# Patient Record
Sex: MALE | Race: WHITE | NOT HISPANIC OR LATINO | Employment: FULL TIME | URBAN - METROPOLITAN AREA
[De-identification: names, ages, dates, MRNs, and addresses within clinical notes are randomized per-mention and may not be internally consistent; named-entity substitution may affect disease eponyms.]

---

## 2017-06-27 ENCOUNTER — TREATMENT (OUTPATIENT)
Dept: PHYSICAL THERAPY | Facility: CLINIC | Age: 57
End: 2017-06-27

## 2017-06-27 DIAGNOSIS — Z02.1 DRUG SCREENING, PRE-EMPLOYMENT: Primary | ICD-10-CM

## 2017-06-27 PROCEDURE — PDS: Performed by: PHYSICAL THERAPIST

## 2020-07-14 ENCOUNTER — CONVERSION ENCOUNTER (OUTPATIENT)
Dept: OTHER | Facility: HOSPITAL | Age: 60
End: 2020-07-14

## 2020-07-14 ENCOUNTER — OFFICE VISIT CONVERTED (OUTPATIENT)
Dept: NEUROSURGERY | Facility: CLINIC | Age: 60
End: 2020-07-14
Attending: NEUROLOGICAL SURGERY

## 2020-08-06 ENCOUNTER — HOSPITAL ENCOUNTER (OUTPATIENT)
Dept: PHYSICAL THERAPY | Facility: CLINIC | Age: 60
Setting detail: RECURRING SERIES
Discharge: HOME | End: 2020-10-22
Attending: NEUROLOGICAL SURGERY

## 2020-08-19 ENCOUNTER — CONVERSION ENCOUNTER (OUTPATIENT)
Dept: FAMILY MEDICINE CLINIC | Age: 60
End: 2020-08-19

## 2020-08-19 ENCOUNTER — HOSPITAL ENCOUNTER (OUTPATIENT)
Dept: OTHER | Facility: HOSPITAL | Age: 60
Discharge: HOME | End: 2020-08-19
Attending: FAMILY MEDICINE

## 2020-08-22 LAB — SARS-COV-2 RNA SPEC QL NAA+PROBE: DETECTED

## 2020-09-25 ENCOUNTER — HOSPITAL ENCOUNTER (OUTPATIENT)
Dept: OTHER | Facility: HOSPITAL | Age: 60
Discharge: HOME | End: 2020-09-25
Attending: NEUROLOGICAL SURGERY

## 2020-10-05 ENCOUNTER — TELEPHONE (OUTPATIENT)
Dept: ORTHOPEDIC SURGERY | Facility: CLINIC | Age: 60
End: 2020-10-05

## 2020-10-12 ENCOUNTER — OFFICE VISIT (OUTPATIENT)
Dept: ORTHOPEDIC SURGERY | Facility: CLINIC | Age: 60
End: 2020-10-12

## 2020-10-12 VITALS — BODY MASS INDEX: 29.62 KG/M2 | HEIGHT: 69 IN | TEMPERATURE: 98 F | WEIGHT: 200 LBS

## 2020-10-12 DIAGNOSIS — M75.101 RIGHT ROTATOR CUFF TEAR ARTHROPATHY: Primary | ICD-10-CM

## 2020-10-12 DIAGNOSIS — M12.811 RIGHT ROTATOR CUFF TEAR ARTHROPATHY: Primary | ICD-10-CM

## 2020-10-12 PROCEDURE — 99203 OFFICE O/P NEW LOW 30 MIN: CPT | Performed by: ORTHOPAEDIC SURGERY

## 2020-10-12 RX ORDER — SIMVASTATIN 40 MG
TABLET ORAL
COMMUNITY
Start: 2020-08-25 | End: 2022-12-21

## 2020-10-12 RX ORDER — MONTELUKAST SODIUM 10 MG/1
10 TABLET ORAL DAILY
COMMUNITY
Start: 2020-09-25 | End: 2023-03-20

## 2020-10-12 RX ORDER — TERAZOSIN 5 MG/1
CAPSULE ORAL
COMMUNITY
Start: 2020-09-25 | End: 2022-12-21 | Stop reason: SDUPTHER

## 2020-10-12 RX ORDER — CYCLOBENZAPRINE HCL 10 MG
TABLET ORAL
COMMUNITY
Start: 2020-09-25

## 2020-10-12 RX ORDER — TRAZODONE HYDROCHLORIDE 50 MG/1
TABLET ORAL
COMMUNITY
Start: 2020-09-27 | End: 2022-12-21

## 2020-10-12 RX ORDER — AMLODIPINE BESYLATE 10 MG/1
10 TABLET ORAL DAILY
COMMUNITY
Start: 2020-09-25

## 2020-10-12 RX ORDER — MELOXICAM 7.5 MG/1
TABLET ORAL
COMMUNITY
Start: 2020-07-15 | End: 2022-12-21

## 2020-10-12 NOTE — PROGRESS NOTES
NEW VISIT    Patient: Jim Verma  ?  YOB: 1960    MRN: [id number]  ?  Chief Complaint   Patient presents with   • Right Shoulder - Pain   • Establish Care      ?  HPI: NEW RIGHT SHOULDER  Jim Levine presents to the office with a massive tear of the rotator cuff.  He has very significant pain in his shoulder for the past 1 year.  His symptoms started with an injury and have slowly and progressively gotten worse.  He works as a  locally in a factory.  He states that his pain is worse with abduction.  He has difficulty with reaching into the overhead position.  Countertop level activities do not bother him as much as reaching into the overhead position.  He has a sense of progressive weakness with lifting heavy weights into the overhead position.  He does have some night pain and rest pain as well.  The patient describes his pain as an intermittent stabbing type of pain.  His MRI documents a large rotator cuff tear with significant atrophy of the supraspinatus.  At age 60 the surgical choices are quite limited for this patient and I would like him to see my partner Dr. HERNÁNDEZ for subspecialty care.      Pain Location: RIGHT shoulder  Radiation: none  Quality: aching, sharp  Intensity/Severity: moderate to severe  Duration: 1 years  Onset quality: gradual   Timing: constant  Aggravating Factors: overhead reaching, reaching backward, reaching forward  Alleviating Factors: NSAIDs, rest, ice  Previous Episodes: no  Associated Symptoms: clicking/popping, decreased ROM, decreased strength  ADLs Affected: grooming/hygiene/toileting/personal care, dressing, recreational activities/sports  Previous Treatment: Anti-inflammatory medication.    This patient is a new patient.  This problem is new to this examiner.      Allergies: No Known Allergies    Medications:   Home Medications:  Current Outpatient Medications on File Prior to Visit   Medication Sig   • amLODIPine (NORVASC) 10 MG  "tablet Take 10 mg by mouth Daily.   • cyclobenzaprine (FLEXERIL) 10 MG tablet TK 1 T PO QHS FOR MUSCULAR PAIN   • meloxicam (MOBIC) 7.5 MG tablet TK 1 T PO D WF FOR ARTHRITIS   • montelukast (SINGULAIR) 10 MG tablet Take 10 mg by mouth Daily.   • simvastatin (ZOCOR) 40 MG tablet TK 1 T PO Q NIGHT   • terazosin (HYTRIN) 5 MG capsule TK 1 C PO HS   • traZODone (DESYREL) 50 MG tablet TK 1 TO 2 TS PO HS     No current facility-administered medications on file prior to visit.      Current Medications:  Scheduled Meds:  PRN Meds:.    I have reviewed the patient's medical history in detail and updated the computerized patient record.  Review and summarization of old records include:    History reviewed. No pertinent past medical history.  History reviewed. No pertinent surgical history.  Social History     Occupational History   • Not on file   Tobacco Use   • Smoking status: Never Smoker   • Smokeless tobacco: Never Used   Substance and Sexual Activity   • Alcohol use: Yes   • Drug use: Defer   • Sexual activity: Defer      History reviewed. No pertinent family history.      Review of Systems   Constitutional: Negative.    HENT: Negative.    Eyes: Negative.    Respiratory: Negative.    Cardiovascular: Negative.    Endocrine: Negative.    Musculoskeletal: Positive for arthralgias.   Skin: Negative.    Allergic/Immunologic: Negative.    Neurological: Negative.    Hematological: Negative.    Psychiatric/Behavioral: Negative.           Wt Readings from Last 3 Encounters:   10/12/20 90.7 kg (200 lb)     Ht Readings from Last 3 Encounters:   10/12/20 175.3 cm (69\")     Body mass index is 29.53 kg/m².  Facility age limit for growth percentiles is 20 years.  Vitals:    10/12/20 1537   Temp: 98 °F (36.7 °C)         Physical Exam  Constitutional: Patient is oriented to person, place, and time. Appears well-developed and well-nourished.   HENT:   Head: Normocephalic and atraumatic.   Eyes: Conjunctivae and EOM are normal. Pupils are " equal, round, and reactive to light.   Cardiovascular: Normal rate, regular rhythm, normal heart sounds and intact distal pulses.   Pulmonary/Chest: Effort normal and breath sounds normal.   Musculoskeletal:   See detailed exam below   Neurological: Alert and oriented to person, place, and time. No sensory deficit. Coordination normal.   Skin: Skin is warm and dry. Capillary refill takes less than 2 seconds. No rash noted. No erythema.   Psychiatric: Patient has a normal mood and affect. His behavior is normal. Judgment and thought content normal.   Nursing note and vitals reviewed.      Ortho Exam:   right shoulder (cta). Rotator cuff function is extremely impaired. There is a high riding humeral head with articulation of the humerus to the undersurface of the acromiohumeral articulation. AC joint is exquisitely tender and painful for patient. Axillary nerve function is well preserved. There is significant winging of the scapula with hollowing of the fossae over the proximal and distal aspects of the spine of the scapula. Forward flexion is 0-90 degrees, active abduction is 0-90 degrees. Drop arm sign is positive. External rotation against resistance is extremely painful and limited for the patient. Skin and soft tissues are essentially normal other than some amounts of swelling. The pain level is 6      Diagnostics:  Reviewed MRI report of right shoulder, summary of impression below:  MRI images are available today in the office.  These images are discussed with the patient.  There is a large retracted tear of the supraspinatus.  There is an articular sided tear of the infraspinatus and the subscap tendons as well.  There is a longitudinal tear of the long head of the biceps tendon.  There is degenerative tear of the glenoid labrum with moderate glenohumeral osteoarthritis.  The MRI changes are consistent clinically with his presentation of a large rotator cuff tear with retraction and some amount of atrophy of  the supraspinatus muscle belly.    Assessment:  Jim was seen today for establish care and pain.    Diagnoses and all orders for this visit:    Right rotator cuff tear arthropathy    Other orders  -     SCANNED - IMAGING          Procedures  ?    Plan      · Rest, ice, compression, and elevation (RICE) therapy  · Stretching and strengthening exercises of the flexors and abductors of the shoulder.  · Intra-articular steroid injection discussed and offered to the patient but he states he would like a more definitive treatment.  · In terms of options I do not think that a direct rotator cuff repair is going to work for this gentleman.  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  The extent of atrophy in the supraspinatus is unlikely to allow him to meet his demands.  As far as pain control is concerned I do think that a technique such as superior capsular reconstruction might be beneficial for this patient.  It does take a very long time to fully recover from that type of surgery.  ·   · Discussed with the patient about reverse total shoulder arthroplasty in the face of a massive rotator cuff tear.  He is not yet ready to retire and therefore I do not feel that reverse total shoulder arthroplasty would be the best option for very active blue-collar worker at age 60.  · Alternate Ibuprofen and Tylenol as needed   · Referred to Dr. Pedro for second opinion for possible candidacy of this patient for superior capsular reconstruction.      Date of encounter: 10/12/2020   Adams Perez MD

## 2020-10-21 ENCOUNTER — OFFICE VISIT (OUTPATIENT)
Dept: ORTHOPEDIC SURGERY | Facility: CLINIC | Age: 60
End: 2020-10-21

## 2020-10-21 VITALS — BODY MASS INDEX: 29.62 KG/M2 | HEIGHT: 69 IN | TEMPERATURE: 97 F | WEIGHT: 200 LBS

## 2020-10-21 DIAGNOSIS — M25.511 RIGHT SHOULDER PAIN, UNSPECIFIED CHRONICITY: Primary | ICD-10-CM

## 2020-10-21 PROCEDURE — 99214 OFFICE O/P EST MOD 30 MIN: CPT | Performed by: ORTHOPAEDIC SURGERY

## 2020-10-21 PROCEDURE — 73030 X-RAY EXAM OF SHOULDER: CPT | Performed by: ORTHOPAEDIC SURGERY

## 2020-10-21 NOTE — PROGRESS NOTES
Patient: Jim Verma    YOB: 1960    Medical Record Number: [id number]    Chief Complaints:  Referral for right shoulder pain    History of Present Illness:     60 y.o. male patient who presents for evaluation of the right shoulder.  The patient is referred to me for further evaluation by Dr. Perez.  The patient reports worsening right shoulder pain and dysfunction.  Pain is described as moderate to severe, constant and aching.  He reports difficulty with overhead activities, reaching and lifting.  He was referred to me to discuss surgical options.    Allergies: No Known Allergies    Home Medications:    Current Outpatient Medications:   •  amLODIPine (NORVASC) 10 MG tablet, Take 10 mg by mouth Daily., Disp: , Rfl:   •  cyclobenzaprine (FLEXERIL) 10 MG tablet, TK 1 T PO QHS FOR MUSCULAR PAIN, Disp: , Rfl:   •  meloxicam (MOBIC) 7.5 MG tablet, TK 1 T PO D WF FOR ARTHRITIS, Disp: , Rfl:   •  montelukast (SINGULAIR) 10 MG tablet, Take 10 mg by mouth Daily., Disp: , Rfl:   •  simvastatin (ZOCOR) 40 MG tablet, TK 1 T PO Q NIGHT, Disp: , Rfl:   •  terazosin (HYTRIN) 5 MG capsule, TK 1 C PO HS, Disp: , Rfl:   •  traZODone (DESYREL) 50 MG tablet, TK 1 TO 2 TS PO HS, Disp: , Rfl:     History reviewed. No pertinent past medical history.    No relevant surgical history    Social History     Occupational History   • Not on file   Tobacco Use   • Smoking status: Never Smoker   • Smokeless tobacco: Never Used   Substance and Sexual Activity   • Alcohol use: Yes   • Drug use: Defer   • Sexual activity: Defer      Social History     Social History Narrative   • Not on file   He is right-hand dominant.  He works at [employer].  His job involves a lot of lifting, pushing and pulling.    History reviewed. No pertinent family history.    Review of Systems:      Constitutional: Denies fever, shaking or chills   Eyes: Denies change in visual acuity   HEENT: Denies nasal congestion or sore throat   Respiratory: Denies  "cough or shortness of breath   Cardiovascular: Denies chest pain or edema  Endocrine: Denies tremors, palpitations, intolerance of heat or cold, polyuria, polydipsia.  GI: Denies abdominal pain, nausea, vomiting, bloody stools or diarrhea  : Denies frequency, urgency, incontinence, retention, or nocturia.  Musculoskeletal: Denies numbness, tingling or loss of motor function except as above  Integument: Denies rash, lesion or ulceration   Neurologic: Denies headache or focal weakness, deficits  Heme: Denies spontaneous or excessive bleeding, epistaxis, hematuria, melena, fatigue, enlarged or tender lymph nodes.      All other pertinent positives and negatives as noted above in HPI.    Physical Exam:   60 y.o. male    Vitals:    10/21/20 0927   Temp: 97 °F (36.1 °C)   TempSrc: Temporal   Weight: 90.7 kg (200 lb)   Height: 175.3 cm (69\")     General:  Patient is awake and alert.  Appears in no acute distress or discomfort.    Psych:  Affect and demeanor are appropriate.    Eyes:  Conjunctiva and sclera appear grossly normal.  Eyes track well and EOM seem to be intact.    Ears:  No gross abnormalities.  Hearing adequate for the exam.    Cardiovascular:  Regular rate and rhythm.    Lungs:  Good chest expansion.  Breathing unlabored.    Extremities: Right shoulder is examined. Skin is benign.  No palpable masses or adenopathy.  Mild tenderness noted over anterior glenohumeral joint and rotator interval.  Motion is full.  He has discomfort through mid arc scaption.  Strength is 4+ out of 5 with elevation in the scapular plane and 5- out of 5 with external rotation.  Good strength with internal rotation.  Positive speeds maneuver.  Positive Earlton's maneuver.  No instability.  Good motor and sensory function in lower arm and hand.  Brisk capillary refill in hand.  Palpable radial pulse.  Good skin turgor.    Imaging:  AP, scapular Y and axillary views of the right shoulder are ordered and reviewed.  No comparison films " are immediately available.  The x-rays show mild glenohumeral arthritis.  Acromiohumeral interval measures approximately 7 mm.      MRI of the right shoulder is not available for review at this time.  I do have the report of the MRI and I reviewed that.  The report describes a massive rotator cuff tear involving the upper subscapularis, entirety of the supraspinatus and the anterior infraspinatus.  The report describes extensive atrophy, moderate glenohumeral arthritis and diffuse labral tearing as well.    Assessment/Plan:  Right shoulder chronic, most likely irreparable rotator cuff tear     He has some arthritis but I do not think it is too bad.  It certainly sounds like his tear is probably irreparable but I would need to review the images themselves.  I based our discussion on the report of the MRI.  We discussed 3 options for him: conservative treatment versus an attempted repair with possible capsule reconstruction versus an arthroplasty.  All 3 options were presented in detail.  He asked a number of questions about those options.      His biggest concern is getting back to work soon as possible.  His job involves quite a bit of lifting, pushing and pulling.  It does sound like he has short-term disability and so that should cover him at least for part of the time.  With conservative treatment, I explained that the tear will likely worsen over time as well his symptomatology.  We can certainly manage those symptoms until such time as he wants to pursue surgery.  With the capsular reconstruction versus repair, I told him he is looking at a 6-month recovery with 6 weeks in a sling.  It may take him up to a year to fully recover his strength and function.  The arthroplasty is a shorter recovery with less time in a sling but he is still looking at being out of work for at least a month or 2.  He would also need to modify his activities accordingly after the arthroplasty.  He wants to think about it but he  indicated to me that he is leaning towards conservative treatment and eventually the arthroplasty.  It sounds like he is not really interested in the repair versus capsular reconstruction simply because of the prolonged recovery time which is understandable.  He is going to see Dr. Perez again in a few weeks to discuss how he wants to proceed.  He can follow-up with me as needed.    Jose Pedro MD    10/21/2020

## 2020-11-19 ENCOUNTER — OFFICE VISIT (OUTPATIENT)
Dept: ORTHOPEDIC SURGERY | Facility: CLINIC | Age: 60
End: 2020-11-19

## 2020-11-19 VITALS — BODY MASS INDEX: 29.62 KG/M2 | HEIGHT: 69 IN | WEIGHT: 200 LBS | TEMPERATURE: 97.1 F

## 2020-11-19 DIAGNOSIS — M75.101 RIGHT ROTATOR CUFF TEAR ARTHROPATHY: Primary | ICD-10-CM

## 2020-11-19 DIAGNOSIS — M12.811 RIGHT ROTATOR CUFF TEAR ARTHROPATHY: Primary | ICD-10-CM

## 2020-11-19 PROCEDURE — 99213 OFFICE O/P EST LOW 20 MIN: CPT | Performed by: ORTHOPAEDIC SURGERY

## 2020-11-19 NOTE — PROGRESS NOTES
FOLLOW UP VISIT    Patient: Jim Verma  ?  YOB: 1960    MRN: [id number]  ?  Chief Complaint   Patient presents with   • Right Shoulder - Follow-up, Pain      ?  HPI:   Jim Levine presents back to the office with continued issues with his right shoulder.  He states that he is doing a whole lot better than before.  Range of motion of the shoulder is improved definitely and he does not have as much night pain as he did before.  His range of motion is also whole lot better.  The patient was seen by my partner Dr. HERNÁNDEZ and given the choice between a superior capsular reconstruction versus reverse total shoulder arthroplasty.  The patient states that he is really not ready for either 1 of those options at this point.  I certainly agree with my patient that we should wait for a reverse total shoulder arthroplasty as long as possible.  I do feel that a superior capsular reconstruction with its prolonged postoperative rehabilitation is not a practical option for him as he works in a local [email or web address].      Pain Location: RIGHT shoulder  Radiation: none  Quality: dull, aching  Intensity/Severity: mild-moderate  Duration: 13 months  Onset quality: gradual   Timing: intermittent  Aggravating Factors: overhead reaching, reaching backward, reaching forward  Alleviating Factors: NSAIDs, rest, ice  Previous Episodes: yes  Associated Symptoms: decreased ROM, decreased strength  ADLs Affected: grooming/hygiene/toileting/personal care, dressing, recreational activities/sports  Previous Treatment: rest, ice, stretching exercises at home.    This patient is an established patient.  This problem is new to this examiner.      Allergies: No Known Allergies    Medications:   Home Medications:  Current Outpatient Medications on File Prior to Visit   Medication Sig   • amLODIPine (NORVASC) 10 MG tablet Take 10 mg by mouth Daily.   • cyclobenzaprine (FLEXERIL) 10 MG tablet TK 1 T PO QHS FOR MUSCULAR PAIN   •  "meloxicam (MOBIC) 7.5 MG tablet TK 1 T PO D WF FOR ARTHRITIS   • montelukast (SINGULAIR) 10 MG tablet Take 10 mg by mouth Daily.   • simvastatin (ZOCOR) 40 MG tablet TK 1 T PO Q NIGHT   • terazosin (HYTRIN) 5 MG capsule TK 1 C PO HS   • traZODone (DESYREL) 50 MG tablet TK 1 TO 2 TS PO HS     No current facility-administered medications on file prior to visit.      Current Medications:  Scheduled Meds:  PRN Meds:.    I have reviewed the patient's medical history in detail and updated the computerized patient record.  Review and summarization of old records include:    Past Medical History:   Diagnosis Date   • Hypertension      History reviewed. No pertinent surgical history.  Social History     Occupational History   • Not on file   Tobacco Use   • Smoking status: Never Smoker   • Smokeless tobacco: Never Used   Substance and Sexual Activity   • Alcohol use: Yes   • Drug use: Defer   • Sexual activity: Defer      History reviewed. No pertinent family history.      Review of Systems   Constitutional: Negative.    HENT: Negative.    Eyes: Negative.    Respiratory: Negative.    Cardiovascular: Negative.    Gastrointestinal: Negative.    Endocrine: Negative.    Musculoskeletal: Positive for arthralgias.   Skin: Negative.    Allergic/Immunologic: Negative.    Neurological: Negative.    Hematological: Negative.    Psychiatric/Behavioral: Negative.           Wt Readings from Last 3 Encounters:   11/19/20 90.7 kg (200 lb)   10/21/20 90.7 kg (200 lb)   10/12/20 90.7 kg (200 lb)     Ht Readings from Last 3 Encounters:   11/19/20 175.3 cm (69\")   10/21/20 175.3 cm (69\")   10/12/20 175.3 cm (69\")     Body mass index is 29.53 kg/m².  Facility age limit for growth percentiles is 20 years.  Vitals:    11/19/20 0902   Temp: 97.1 °F (36.2 °C)         Physical Exam  Constitutional: Patient is oriented to person, place, and time. Appears well-developed and well-nourished.   HENT:   Head: Normocephalic and atraumatic.   Eyes: " Conjunctivae and EOM are normal. Pupils are equal, round, and reactive to light.   Cardiovascular: Normal rate, regular rhythm, normal heart sounds and intact distal pulses.   Pulmonary/Chest: Effort normal and breath sounds normal.   Musculoskeletal:   See detailed exam below   Neurological: Alert and oriented to person, place, and time. No sensory deficit. Coordination normal.   Skin: Skin is warm and dry. Capillary refill takes less than 2 seconds. No rash noted. No erythema.   Psychiatric: Patient has a normal mood and affect. His behavior is normal. Judgment and thought content normal.   Nursing note and vitals reviewed.      Ortho Exam:   Right shoulder (cta). Rotator cuff function is extremely impaired. There is a high riding humeral head with articulation of the humerus to the undersurface of the acromiohumeral articulation. AC joint is exquisitely tender and painful for patient. Axillary nerve function is well preserved. There is significant winging of the scapula with hollowing of the fossae over the proximal and distal aspects of the spine of the scapula. Forward flexion is 0-30 degrees, active abduction is 0-60 degrees. Drop arm sign is positive. External rotation against resistance is extremely painful and limited for the patient. Skin and soft tissues are essentially normal other than some amounts of swelling. The pain level is 5        Diagnostics:  no diagnostic testing performed this visit      Assessment:  Diagnoses and all orders for this visit:    1. Right rotator cuff tear arthropathy (Primary)          Procedures  ?    Plan    · Calcium and vitamin D for bone health.  · Our practical options will be most likely surgically speaking a reverse total shoulder arthroplasty.  The patient would like to wait as long as possible because of the significant restrictions that would be imposed on his personal life and professional life following the reverse shoulder replacement for a successful outcome.  He  will let me know when he is ready for that form of intervention based on symptom progression.  · Discussed intra-articular steroid injections to the right shoulder.  · Rest, ice, compression, and elevation (RICE) therapy  · Stretching and strengthening exercises of the flexors and abductors of the shoulder to prevent arthrofibrosis.  · Alternate Ibuprofen and Tylenol as needed  · Follow up in 6 month(s)  · Patient will eventually need a right total shoulder replacement     Date of encounter: 11/19/2020   Adams Perez MD

## 2021-05-10 NOTE — H&P
History and Physical      Patient Name: Jim Verma   Patient ID: [id number]   Sex: Male   YOB: 1960    Referring Provider: Margarette Abraham MD    Visit Date: July 14, 2020    Provider: Valdez Stinson MD   Location: [facility]   Location Address: [address] [place] [address]  [id number]   Location Phone: [phone]          Chief Complaint  · Neck and right arm pain  · Neck and left arm pain      History Of Present Illness  The patient is a 60 year old /White male, who presents on referral from Margarette Abraham MD, for a neurosurgical evaluation for a history of neck pain and right greater than left shoulder pain.   The neck pain is localized to the posterior and inferior cervical region and has been present for several months. It is 8/10 in severity and radiates into the right and left shoulder on the right and arm on the left (with numbness in the whole hand) distribution. The pain is described as being intermittent and it is generally following no specific pattern. The patient states the pain is aggravated by movement of the right arm. He reports the pain is alleviated by rest.   The onset of the symptoms was not associated with any specific event or activity.   He also reports intermittent numbness into the left hand involving all the finger. The patient has no prior history of neck or back surgery. He reports he broke his neck a few years ago and he wore a hard collar for a few months.   RECENT INTERVENTIONS:  He reports undergoing no recent treatment for the symptoms described above.   INFORMATION REVIEWED:  The following information was reviewed: radiology reports and radiographic images. The MRI of the cervical spine revealed multilevel cervical spondylosis with some foraminal narrowing. No significant spinal canal stenosis.       Past Medical History  Cervicalgia; Hyperlipidemia; Hypertension, Benign Essential; Muscle cramps         Past Surgical  History  *Denies any surgical procedures         Medication List  amlodipine 10 mg oral tablet; cyclobenzaprine 10 mg oral tablet; montelukast 10 mg oral tablet; omeprazole 20 mg oral capsule,delayed release(DR/EC); simvastatin 40 mg oral tablet; terazosin 5 mg oral capsule; trazodone 50 mg oral tablet         Allergy List  NO KNOWN DRUG ALLERGIES       Allergies Reconciled  Family Medical History  Family history of Arthritis; Family history of cancer; Family history of heart disease         Social History  Alcohol (Current some day); lives alone; Single; Tobacco (Former); Working         Review of Systems  · Constitutional  o Denies  o : chills, excessive sweating, fatigue, fever, sycope/passing out, weight gain, weight loss  · Eyes  o Denies  o : changes in vision, blurry vision, double vision  · HENT  o Admits  o : nasal congestion, sinus pain, seasonal allergies  o Denies  o : loss of hearing, ringing in the ears, ear aches, sore throat, nose bleeds  · Cardiovascular  o Denies  o : blood clots, swollen legs, anemia, easy burising or bleeding, transfusions  · Respiratory  o Denies  o : shortness of breath, dry cough, productive cough, pneumonia, COPD  · Gastrointestinal  o Admits  o : reflux  o Denies  o : difficulty swallowing  · Genitourinary  o Admits  o : erectile dysfunction  o Denies  o : incontinence  · Neurologic  o Admits  o : loss of balance, difficulty with sleep, numbness/tingling/paresthesia , weakness  o Denies  o : headache, seizure, stroke, tremor, falls, dizziness/vertigo, difficulty with coordination, difficulty with dexterity  · Musculoskeletal  o Admits  o : neck stiffness/pain, muscle aches, joint pain, weakness, spasms, pain radiating in arm, low back pain  o Denies  o : swollen lymph nodes, sciatica, pain radiating in leg  · Endocrine  o Denies  o : diabetes, thyroid disorder  · Psychiatric  o Admits  o : anxiety  o Denies  o : depression      Vitals  Date Time BP Position Site L\R Cuff  "Size HR RR TEMP (F) WT  HT  BMI kg/m2 BSA m2 O2 Sat        07/14/2020 01:59 PM        97.6 200lbs 4oz 5'  9\" 29.57 2.1           Physical Examination  · Constitutional  o Appearance  o : well-nourished, well developed, alert, in no acute distress  · Neck  o Inspection/Palpation  o : normal appearance, no masses or tenderness, trachea midline  o Range of Motion  o : reduced ROM, increased pain with flexion  · Respiratory  o Respiratory Effort  o : breathing unlabored  · Cardiovascular  o Peripheral Vascular System  o :   § Extremities  § : no edema or cyanosis  · Musculoskeletal  o Spine  o :   § Stability  § : no subluxations present  § Muscle Strength/Tone  § : paraspinal muscle strength within normal limits, paraspinal muscle tone within normal limits  o Right Upper Extremity  o :   § Inspection/Palpation  § : no tenderness to palpation  § Joint Stability  § : shoulder, elbow and wrist joint stability normal  § Range of Motion  § : pain with internal and external rotation, TTP  o Left Upper Extremity  o :   § Inspection/Palpation  § : no tenderness to palpation  § Joint Stability  § : shoulder, elbow and wrist joint stability normal  § Range of Motion  § : range of motion normal, no joint crepitus present, no pain with joint motion, shoulder negative  · Skin and Subcutaneous Tissue  o Neck  o : no lesions or areas of discoloration  · Neurologic  o Mental Status Examination  o :   § Orientation  § : alert and oriented to person, place, time and events  o Motor Examination  o :   § RUE Strength  § : strength normal  § RUE Motor Function  § : tone normal, muscle bulk normal  § LUE Strength  § : strength normal  § LUE Motor Function  § : tone normal, muscle bulk normal  o Reflexes  o :   § RUE  § : 2/4 in biceps/triceps/brachioradialis, Curran sign negative  § LUE  § : 2/4 in biceps/triceps/brachioradialis, Curran sign negative  o Sensation  o :   § Light Touch  § : sensation intact to light touch in " extremities  o Gait and Station  o :   § Gait Screening  § : normal gait, able to stand without difficulty  · Psychiatric  o Mood and Affect  o : mood normal, affect appropriate          Assessment  · Cervicalgia     723.1/M54.2  · Cervical spondylosis without myelopathy     721.0/M47.812  Multilevel foraminal narrowing  · Right shoulder pain     719.41/M25.511  Increased with rotation/palpation    Problems Reconciled  Plan  · Orders  o PHYSICAL THERAPY CONSULTATION (PHYTH) - - 07/14/2020   Neck pain, bilateral arm pain, numbness in the left arm/hand Evaluate and treat  o MR shoulder RT wo con (10466) - - 07/14/2020   Severe right shoulder pain worse with movement and palpation [place]  · Medications  o Medications have been Reconciled  o Transition of Care or Provider Policy  · Instructions  o Encouraged to follow-up with Primary Care Provider for preventative care.  o The ROS and the PFSH were reviewed at today's visit.  o The patient wishes a referral to physical therapy.  o I think his worst pain (the right shoulder), is most likely due to a shoulder injury and not from the neck. We are arranging a shoulder MRI.   o We will also start PT on the neck and see in f/u after.   · Disposition  o Return to clinic in 6 weeks.            Electronically Signed by: Valdez Stinson MD -Author on July 14, 2020 03:29:16 PM

## 2021-05-14 DIAGNOSIS — M25.511 RIGHT SHOULDER PAIN, UNSPECIFIED CHRONICITY: Primary | ICD-10-CM

## 2021-05-15 VITALS — WEIGHT: 200.25 LBS | TEMPERATURE: 97.6 F | BODY MASS INDEX: 29.66 KG/M2 | HEIGHT: 69 IN

## 2021-05-20 ENCOUNTER — HOSPITAL ENCOUNTER (OUTPATIENT)
Dept: OTHER | Facility: HOSPITAL | Age: 61
Discharge: HOME | End: 2021-05-20
Attending: ORTHOPAEDIC SURGERY

## 2021-05-20 ENCOUNTER — OFFICE VISIT (OUTPATIENT)
Dept: ORTHOPEDIC SURGERY | Facility: CLINIC | Age: 61
End: 2021-05-20

## 2021-05-20 VITALS — HEIGHT: 69 IN | TEMPERATURE: 97.7 F | BODY MASS INDEX: 29.33 KG/M2 | WEIGHT: 198 LBS

## 2021-05-20 DIAGNOSIS — M12.811 RIGHT ROTATOR CUFF TEAR ARTHROPATHY: Primary | ICD-10-CM

## 2021-05-20 DIAGNOSIS — M75.101 RIGHT ROTATOR CUFF TEAR ARTHROPATHY: Primary | ICD-10-CM

## 2021-05-20 PROCEDURE — 99213 OFFICE O/P EST LOW 20 MIN: CPT | Performed by: ORTHOPAEDIC SURGERY

## 2021-05-20 PROCEDURE — 20610 DRAIN/INJ JOINT/BURSA W/O US: CPT | Performed by: ORTHOPAEDIC SURGERY

## 2021-05-20 RX ORDER — TRAMADOL HYDROCHLORIDE 50 MG/1
50 TABLET ORAL NIGHTLY PRN
Qty: 40 TABLET | Refills: 0 | Status: SHIPPED | OUTPATIENT
Start: 2021-05-20

## 2021-05-20 RX ADMIN — LIDOCAINE HYDROCHLORIDE 2 ML: 10 INJECTION, SOLUTION INFILTRATION; PERINEURAL at 09:36

## 2021-05-20 RX ADMIN — METHYLPREDNISOLONE ACETATE 160 MG: 80 INJECTION, SUSPENSION INTRA-ARTICULAR; INTRALESIONAL; INTRAMUSCULAR; SOFT TISSUE at 09:36

## 2021-05-28 RX ORDER — LIDOCAINE HYDROCHLORIDE 10 MG/ML
2 INJECTION, SOLUTION INFILTRATION; PERINEURAL
Status: COMPLETED | OUTPATIENT
Start: 2021-05-20 | End: 2021-05-20

## 2021-05-28 RX ORDER — METHYLPREDNISOLONE ACETATE 80 MG/ML
160 INJECTION, SUSPENSION INTRA-ARTICULAR; INTRALESIONAL; INTRAMUSCULAR; SOFT TISSUE
Status: COMPLETED | OUTPATIENT
Start: 2021-05-20 | End: 2021-05-20

## 2021-07-02 VITALS — BODY MASS INDEX: 29.57 KG/M2 | TEMPERATURE: 97.8 F | HEIGHT: 69 IN

## 2021-07-20 ENCOUNTER — TELEPHONE (OUTPATIENT)
Dept: ORTHOPEDIC SURGERY | Facility: CLINIC | Age: 61
End: 2021-07-20

## 2021-07-22 ENCOUNTER — TELEPHONE (OUTPATIENT)
Dept: ORTHOPEDIC SURGERY | Facility: CLINIC | Age: 61
End: 2021-07-22

## 2021-07-27 ENCOUNTER — TELEPHONE (OUTPATIENT)
Dept: ORTHOPEDIC SURGERY | Facility: CLINIC | Age: 61
End: 2021-07-27

## 2021-07-27 NOTE — TELEPHONE ENCOUNTER
PATIENT STATES THAT HE WILL NEED TO HAVE A COLONOSCOPY FIRST BUT HE DOESN'T KNOW THAT DATE YET. HE WILL CALL BACK TO LET ME KNOW THAT DATE AND WE WILL SCHEDULE AFTER THAT./AW

## 2021-08-13 ENCOUNTER — TELEPHONE (OUTPATIENT)
Dept: ORTHOPEDIC SURGERY | Facility: CLINIC | Age: 61
End: 2021-08-13

## 2021-08-13 NOTE — TELEPHONE ENCOUNTER
Caller: PAO MARY    Relationship to patient: SELF    Best call back number:[phone]    Patient is needing: PT CALLED TO GIVE FAX # TO SEND DISABILITY PAPER -[phone] AND ALSO HE NEEDS TO HAVE THE RETURN TO WORK DATE LISTED.  THE FAX# ON THE FORM WAS INCORRECT.

## 2021-08-13 NOTE — TELEPHONE ENCOUNTER
Caller:  PAO MARY    Relationship: SELF    Best call back number:    What form or medical record are you requesting: FMLA    How would you like to receive the form or medical records (pick-up, mail, fax): PICKUP    If pick-up, provide patient with address and location details    Timeframe paperwork needed: TODAY    Additional notes: UNABLE TO WARM TRANSFER - PER PATIENT HE GAVE YOU GUYS WRONG FAX# AND WAS WANTING TO KNOW IF COULD  TODAY - HE DROPPED THEM OFF WED.

## 2021-08-19 ENCOUNTER — OFFICE VISIT (OUTPATIENT)
Dept: ORTHOPEDIC SURGERY | Facility: CLINIC | Age: 61
End: 2021-08-19

## 2021-08-19 VITALS — TEMPERATURE: 96.9 F | WEIGHT: 195 LBS | HEIGHT: 69 IN | BODY MASS INDEX: 28.88 KG/M2

## 2021-08-19 DIAGNOSIS — M12.811 RIGHT ROTATOR CUFF TEAR ARTHROPATHY: Primary | ICD-10-CM

## 2021-08-19 DIAGNOSIS — M75.101 RIGHT ROTATOR CUFF TEAR ARTHROPATHY: Primary | ICD-10-CM

## 2021-08-19 PROCEDURE — 99214 OFFICE O/P EST MOD 30 MIN: CPT | Performed by: ORTHOPAEDIC SURGERY

## 2021-08-19 NOTE — PROGRESS NOTES
"Chief Complaint  Follow-up and Pain of the Right Shoulder    Subjective    History of Present Illness      Jim Verma is a 61 y.o. male who presents to [facility] for Right shoulder pain and irreparable rotator cuff tear.  History of Present Illness patient presents to the office with increasing pain and discomfort with his right shoulder.  The pain is worse when he is working.  He finds it very difficult to reach into the overhead, abducted position.  Cross body activities bother him significantly as well.  The patient states that he was seen by my partner Dr. HERNÁNDEZ and was told that he is not a very good candidate for superior capsular reconstruction.  The patient states \"I do not want a patch on my shoulder.  I wanted to be completely restored \".  He states that he cannot work because of his pain and discomfort and is now ready to proceed with surgical intervention.  He is a  at the local [employer] and states that he wants a better quality of life.  Pain Location: RIGHT shoulder  Radiation: none  Quality: aching  Intensity/Severity: mild-moderate  Duration: several months  Progression of symptoms: yes, progressive worsening  Onset quality: gradual   Timing: intermittent  Aggravating Factors: overhead reaching, reaching backward, reaching forward  Alleviating Factors: NSAIDs, steroid injection (intra-articular), rest, ice  Previous Episodes: yes  Associated Symptoms: pain, decreased ROM, decreased strength  ADLs Affected: work related activities, recreational activities/sports  Previous Treatment: NSAIDs and intra-articular injection       Objective   Vital Signs:   Temp 96.9 °F (36.1 °C)   Ht 175.3 cm (69\")   Wt 88.5 kg (195 lb)   BMI 28.80 kg/m²     Physical Exam  Physical Exam  Vitals signs and nursing note reviewed.   Constitutional:       Appearance: Normal appearance.   Pulmonary:      Effort: Pulmonary effort is normal.   Skin:     General: Skin " is warm and dry.      Capillary Refill: Capillary refill takes less than 2 seconds.   Neurological:      General: No focal deficit present.      Mental Status: He is alert and oriented to person, place, and time. Mental status is at baseline.   Psychiatric:         Mood and Affect: Mood normal.         Behavior: Behavior normal.         Thought Content: Thought content normal.         Judgment: Judgment normal.     Ortho Exam   Right shoulder (cta). Rotator cuff function is extremely impaired. There is a high riding humeral head with articulation of the humerus to the undersurface of the acromiohumeral articulation. AC joint is exquisitely tender and painful for patient. Axillary nerve function is well preserved. There is significant winging of the scapula with hollowing of the fossae over the proximal and distal aspects of the spine of the scapula. Forward flexion is 0-90 degrees, active abduction is 0-90 degrees. Drop arm sign is positive. External rotation against resistance is extremely painful and limited for the patient. Skin and soft tissues are essentially normal other than some amounts of swelling. The pain level is 5      Result Review :   The following data was reviewed by: Adams Perez MD on 08/19/2021:  Radiologic studies - see below for interpretation  Right shoulder xrays ap/lateral views were ordered by Adams Perez MD. Performed at [facility] on 05/20/21. Images were independently viewed and interpreted by myself, my impression as follows:      right Shoulder X-Ray  Indication: Pain and limited range of motion of the shoulder.  AP and Lateral  Findings: High riding humeral head with sclerosis over the greater tuberosity of the humerus.  no bony lesion  Soft tissues within normal limits  within normal limits joint spaces  Hardware appropriately positioned not applicable      yes prior studies available for comparison.    X-RAY was ordered and reviewed by Adams Perez  MD          Procedures           Assessment   Assessment and Plan    Diagnoses and all orders for this visit:    1. Right rotator cuff tear arthropathy (Primary)          Follow Up   · Extensive discussion of the patient to the extent of 45 minutes.  He states that he wants to proceed with a right reverse total shoulder arthroplasty.  · Timeout for potential complications and postoperative management discussed with the patient at length.  · Calcium and vitamin D for bone health.  · Rest, ice, compression, and elevation (RICE) therapy  · Stretching and strengthening exercises of the flexors and abductors of the shoulder to prevent arthrofibrosis.  · OTC Alternate Ibuprofen and Tylenol as needed  · Patient is scheduled for surgery on 09/20/2021  • Patient was given instructions and counseling regarding his condition or for health maintenance advice. Please see specific information pulled into the AVS if appropriate.   After evaluation in the office, x-rays and history, we have diagnosed rotator cuff arthropathy of the shoulder.  This is a degenerative condition with a chronic tear of the rotator cuff that is not repairable with loss of cartilage in the glenohumeral joint. The only definitive treatment for this condition is total shoulder arthroplasty or joint replacement.  Conservative measures have been used, including cortisone injections, NSAIDS, activity modification and physical therapy.  This will require 3-4 months of time for recovery. It starts with 4-6 weeks in a sling, followed by rehabilitation at physical therapy and exercises to be done at home.  With this type of implant, you will need to take oral antibiotics prior to all dental visits and for some procedures (for example: colonoscopy, skin lesion removal, etc).  This will be a single dose one hour prior to the procedure.      Risks and benefits of surgery were discussed with the patient in detail.  Risks include but are not limited to infection,  myocardial infarction, stroke, DVT, pulmonary embolism, death, dislocation, neurovascular compromise, limb length discrepancy, revision surgery, limited range of motion, wound healing problems and scar tissue build-up.  Patient understands and agrees to proceed with surgery.  •     Adams Perez MD   Date of Encounter: 8/19/2021       EMR Dragon/Transcription disclaimer:  Much of this encounter note is an electronic transcription/translation of spoken language to printed text. The electronic translation of spoken language may permit erroneous, or at times, nonsensical words or phrases to be inadvertently transcribed; Although I have reviewed the note for such errors, some may still exist.

## 2021-09-20 ENCOUNTER — OUTSIDE FACILITY SERVICE (OUTPATIENT)
Dept: ORTHOPEDIC SURGERY | Facility: CLINIC | Age: 61
End: 2021-09-20

## 2021-09-20 ENCOUNTER — TELEPHONE (OUTPATIENT)
Dept: ORTHOPEDIC SURGERY | Facility: CLINIC | Age: 61
End: 2021-09-20

## 2021-09-20 PROCEDURE — 23472 RECONSTRUCT SHOULDER JOINT: CPT | Performed by: ORTHOPAEDIC SURGERY

## 2021-09-21 ENCOUNTER — TELEPHONE (OUTPATIENT)
Dept: ORTHOPEDIC SURGERY | Facility: CLINIC | Age: 61
End: 2021-09-21

## 2021-09-21 RX ORDER — FERROUS SULFATE 325(65) MG
1 TABLET ORAL DAILY
COMMUNITY
Start: 2021-09-16 | End: 2023-03-20

## 2021-09-21 NOTE — TELEPHONE ENCOUNTER
Patient states when he got to the pharmacy he got his pain medication and iron but no antibiotic. I advised typically we usually don't send home a antibiotic.     Did you want him to have one?     Please Advise    Deven Barba

## 2021-09-28 DIAGNOSIS — M12.811 RIGHT ROTATOR CUFF TEAR ARTHROPATHY: Primary | ICD-10-CM

## 2021-09-28 DIAGNOSIS — M75.101 RIGHT ROTATOR CUFF TEAR ARTHROPATHY: Primary | ICD-10-CM

## 2021-09-28 RX ORDER — OXYCODONE HYDROCHLORIDE AND ACETAMINOPHEN 5; 325 MG/1; MG/1
TABLET ORAL
Qty: 40 TABLET | Refills: 0 | Status: SHIPPED | OUTPATIENT
Start: 2021-09-28 | End: 2021-10-19 | Stop reason: SDUPTHER

## 2021-09-28 NOTE — TELEPHONE ENCOUNTER
PATIENT CALLED AND IS REQUESTING A REFILL ON  HIS PAIN MEDICATION (PERCOCET 5/325).  HE IS S/P RIGHT REVERSE TOTAL SHOULDER REPLACEMENT ON 9/20/21 AND USES [facility] IN [place]

## 2021-10-01 DIAGNOSIS — M25.511 RIGHT SHOULDER PAIN, UNSPECIFIED CHRONICITY: Primary | ICD-10-CM

## 2021-10-04 ENCOUNTER — OFFICE VISIT (OUTPATIENT)
Dept: ORTHOPEDIC SURGERY | Facility: CLINIC | Age: 61
End: 2021-10-04

## 2021-10-04 ENCOUNTER — HOSPITAL ENCOUNTER (OUTPATIENT)
Dept: GENERAL RADIOLOGY | Facility: HOSPITAL | Age: 61
Discharge: HOME | End: 2021-10-04
Admitting: PHYSICIAN ASSISTANT

## 2021-10-04 VITALS — TEMPERATURE: 98 F | BODY MASS INDEX: 28.88 KG/M2 | WEIGHT: 195 LBS | HEIGHT: 69 IN

## 2021-10-04 DIAGNOSIS — Z96.611 S/P REVERSE TOTAL SHOULDER ARTHROPLASTY, RIGHT: Primary | ICD-10-CM

## 2021-10-04 DIAGNOSIS — M25.511 RIGHT SHOULDER PAIN, UNSPECIFIED CHRONICITY: ICD-10-CM

## 2021-10-04 PROCEDURE — 73030 X-RAY EXAM OF SHOULDER: CPT

## 2021-10-04 PROCEDURE — 99024 POSTOP FOLLOW-UP VISIT: CPT | Performed by: PHYSICIAN ASSISTANT

## 2021-10-04 RX ORDER — ROSUVASTATIN CALCIUM 10 MG/1
10 TABLET, COATED ORAL DAILY
COMMUNITY
Start: 2021-09-14

## 2021-10-04 RX ORDER — HYDROXYZINE HYDROCHLORIDE 25 MG/1
25 TABLET, FILM COATED ORAL 3 TIMES DAILY PRN
COMMUNITY
Start: 2021-07-20 | End: 2022-12-21

## 2021-10-04 RX ORDER — NICOTINE POLACRILEX 4 MG/1
20 GUM, CHEWING ORAL DAILY
COMMUNITY
Start: 2021-09-16

## 2021-10-04 NOTE — PATIENT INSTRUCTIONS
Post-operative Phase 1 (Weeks 0-6):    Goals:   1.) Ensure wound healing  2.) Protect the repair  3.) Decrease pain     Brace Instructions:  · Wear sling/pillow at all times except when showering or performing pendulum exercises.  · You should remove the sling and perform pendulum exercises 5 or 6 times per day for 5-10 minutes at a time.  · For the first 6 weeks, when lying supine, please place a pillow or a rolled towel behind her arm to limit extension, which places stress on the tissues on the front of your shoulder.  · A good rule of thumb is that you should always be able to visualize your elbow when lying down.    Weightbearing (WB) status:  No lifting of objects heavier than a coffee cup.  No shoulder extension or sudden reaching.  No shoulder motion behind the back.    Range of motion (ROM):  NO ACTIVE ROM OF THE SHOULDER.  Full active ROM of elbow, wrist and hand.  No shoulder extension  Icing of the shoulder  For exercise may use stationary bike, elliptical machine, stair stepper          Post-operative Phase 2 (Weeks 7-8):    Goals:   1.)  Protect the shoulder arthroplasty  2.)  Prevent shoulder stiffness  3.)  Improve range of motion  4.)  Begin strengthening    Brace Instructions:  · Gradually discontinue use of the sling/pillow    Weightbearing (WB) status:  · No lifting of objects heavier than a coffee cup  · No excessive stretching or sudden movements  · He may begin to use the arm for routine daily activities such as feeding, dressing, bathing and personal hygiene  · Continue to avoid shoulder extension  · You may raise the arm away from the body, but not while carrying anything heavier than a coffee cup  · No forceful pushing or pulling  · No supporting her body weight with your hands    Range of motion (ROM):  · Continue previous range of motion exercises  · Advance active assisted range of motion as tolerated.  · Progress from supine to sitting/standing  · Continue to avoid shoulder extension,  especially in abduction and internal rotation  · Begin passive internal rotation in the scapular plane, but restrict to less than 45 degrees          Post-operative Phase 3 (Weeks 9-12):    Goals:   1.)  Protect the shoulder arthroplasty  2.)  Continue ROM gains  3.)  Advanced strengthening  4.)  Increase functional activities    Brace Instructions:  · None    Weightbearing (WB) status:  · No heavy lifting or manual labor  · No shoulder extension, excessive stretching or sudden movements  · No supporting body weight with the hands    Range of motion (ROM):  · Continue previous passive, active assisted range of motion  · Advance to active range of motion supine, forward flexion and elevation in the plane of the scapula  · Continue to avoid shoulder extension          Post-operative Phase 4 (Weeks 13-18):    Goals:   1.)  Protect the shoulder arthroplasty  2.)  Continue gentle strengthening  3.)  Add functional activities  4.)  Improve neuromuscular control    Brace Instructions:  · None    Weightbearing (WB) status:  · No forceful pushing or pulling  · No lifting greater than 5 pounds with the operative arm  · No supporting body weight with the hands    Range of motion (ROM):  · Continue full motion with light stretching at extremes

## 2021-10-04 NOTE — PROGRESS NOTES
POST OP TOTAL GLOBAL      NAME: Jim Verma           : 1960    MRN: [id number]    Chief Complaint   Patient presents with   • Right Shoulder - Follow-up, Pain   • Post-op       Date of Surgery: 21  ?  HPI:   Patient returns today for 2 week follow up of right reverse total shoulder arthroplasty. Incision(s) healing nicely/as expected with no signs of infection. Patient reports doing well with no unusual complaints. No fevers, rigors or chills. Appears to be progressing appropriately. Patient is on appropriate anticoagulation. He is wanting to do PT at [facility].       Ortho Exam:   Right shoulder:   Patient is postoperative 2 week(s).   Afebrile. Vital signs are stable.   Deltopectoral incision is clean and healing well.   Axillary nerve function is well preserved.   There is no glenohumeral instability.   No clicking, popping or catching is noted.   Apprehension sign is negative.   Axillary nerve function is well preserved.   Radial artery pulses are palpable.   There is no clinical deformity.   Range of motion is flexion 30 degrees.      Diagnostic Studies:  RIGHT shoulder xrays 4 views were ordered by Ha Burger PA-C. Performed at [facility] on 10/4/21. Images were independently viewed and interpreted by myself, my impression as follows:  Findings: expected postoperative appearance of right shoulder  Bony lesion: no  Soft tissues: within normal limits  Joint spaces: within normal limits  Hardware appropriately positioned: yes  Prior studies available for comparison: yes         Assessment:  Diagnoses and all orders for this visit:    1. S/P reverse total shoulder arthroplasty, right (Primary)  -     Ambulatory Referral to Physical Therapy Evaluate and treat (s/p rev tsa), POST OP            Plan   · Incision care  · Continue ice to joint   · Active/Active Assisted ROM of elbow, wrist and hand  · May begin pendulum exercises   · No lifting, pushing or pulling activity    · Falls precautions  · Continue with lifelong antibiotic prophylaxis with dental procedures following total joint replacement.  · Follow up in 4-6 week(s)    Date of encounter: 10/04/2021   Ha Burger PA-C    Electronically signed by Ha Burger PA-C, 10/04/21, 1:51 PM EDT.

## 2021-10-15 ENCOUNTER — TELEPHONE (OUTPATIENT)
Dept: ORTHOPEDIC SURGERY | Facility: CLINIC | Age: 61
End: 2021-10-15

## 2021-10-15 ENCOUNTER — HOSPITAL ENCOUNTER (OUTPATIENT)
Dept: GENERAL RADIOLOGY | Facility: HOSPITAL | Age: 61
Discharge: HOME | End: 2021-10-15
Admitting: PHYSICIAN ASSISTANT
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
## 2021-10-15 DIAGNOSIS — M25.511 ACUTE PAIN OF RIGHT SHOULDER: ICD-10-CM
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
## 2021-10-15 PROCEDURE — 73030 X-RAY EXAM OF SHOULDER: CPT

## 2021-10-15 NOTE — TELEPHONE ENCOUNTER
PATIENT FELL ON HIS RIGHT SHOULDER (POST-OP RIGHT SHOULDER REPLACEMENT) AND IS CONCERNED HE MAY HAVE RE-INJURED THAT SHOULDER. I HAVE ENTERED AN X-RAY ORDER FOR DOWNSTAIRS SO THAT HE CAN GET AN X-RAY TO MAKE SURE EVERYTHING IS OKAY.   ALINE, AFTER PATIENT HAS X-RAY CAN YOU PLEASE TAKE A LOOK AND ADVISE. THANKS/RJ

## 2021-10-15 NOTE — TELEPHONE ENCOUNTER
I have reviewed the xrays-radiologist comments on possible fragment inferior to humeral component but on comparison to recent xray it looks unchanged. Follow up with [facility]

## 2021-10-18 ENCOUNTER — TELEPHONE (OUTPATIENT)
Dept: ORTHOPEDIC SURGERY | Facility: CLINIC | Age: 61
End: 2021-10-18

## 2021-10-18 DIAGNOSIS — M12.811 RIGHT ROTATOR CUFF TEAR ARTHROPATHY: ICD-10-CM

## 2021-10-18 DIAGNOSIS — M75.101 RIGHT ROTATOR CUFF TEAR ARTHROPATHY: ICD-10-CM

## 2021-10-18 NOTE — TELEPHONE ENCOUNTER
PATIENT REQUESTING REFILL ON OXYCODONE    HE HAD TOTAL REVERSE SHOULDER ON 09/20/2021    [facility]

## 2021-10-19 RX ORDER — OXYCODONE HYDROCHLORIDE AND ACETAMINOPHEN 5; 325 MG/1; MG/1
TABLET ORAL
Qty: 20 TABLET | Refills: 0 | Status: SHIPPED | OUTPATIENT
Start: 2021-10-19 | End: 2022-12-21

## 2021-10-28 ENCOUNTER — APPOINTMENT (OUTPATIENT)
Dept: CT IMAGING | Facility: HOSPITAL | Age: 61
End: 2021-10-28

## 2021-10-28 ENCOUNTER — HOSPITAL ENCOUNTER (EMERGENCY)
Facility: HOSPITAL | Age: 61
Discharge: HOME | End: 2021-10-28
Attending: EMERGENCY MEDICINE | Admitting: EMERGENCY MEDICINE

## 2021-10-28 VITALS
HEIGHT: 69 IN | RESPIRATION RATE: 18 BRPM | WEIGHT: 194.67 LBS | DIASTOLIC BLOOD PRESSURE: 97 MMHG | HEART RATE: 81 BPM | SYSTOLIC BLOOD PRESSURE: 166 MMHG | BODY MASS INDEX: 28.83 KG/M2 | TEMPERATURE: 97.3 F | OXYGEN SATURATION: 95 %

## 2021-10-28 DIAGNOSIS — G25.3 MUSCLE JERKS DURING SLEEP: Primary | ICD-10-CM

## 2021-10-28 PROCEDURE — 70450 CT HEAD/BRAIN W/O DYE: CPT

## 2021-10-28 PROCEDURE — 99283 EMERGENCY DEPT VISIT LOW MDM: CPT

## 2021-10-29 ENCOUNTER — OFFICE VISIT (OUTPATIENT)
Dept: ORTHOPEDIC SURGERY | Facility: CLINIC | Age: 61
End: 2021-10-29

## 2021-10-29 VITALS — BODY MASS INDEX: 28.73 KG/M2 | HEIGHT: 69 IN | WEIGHT: 194 LBS

## 2021-10-29 DIAGNOSIS — Z96.611 S/P REVERSE TOTAL SHOULDER ARTHROPLASTY, RIGHT: Primary | ICD-10-CM

## 2021-10-29 PROCEDURE — 99024 POSTOP FOLLOW-UP VISIT: CPT | Performed by: ORTHOPAEDIC SURGERY

## 2021-10-29 RX ORDER — HYDROCODONE BITARTRATE AND ACETAMINOPHEN 5; 325 MG/1; MG/1
1 TABLET ORAL EVERY 12 HOURS PRN
Qty: 40 TABLET | Refills: 0 | Status: SHIPPED | OUTPATIENT
Start: 2021-10-29 | End: 2023-03-20

## 2021-10-29 NOTE — PROGRESS NOTES
"Chief Complaint  Pain and Fall of the Right Shoulder    Subjective    History of Present Illness {CC  Problem List  Visit Diagnosis   Encounters  Notes  Medications  Labs  Result Review Imaging  Media :23}     Jim Verma is a 61 y.o. male who presents to [facility] for   History of Present Illness   Pain Location: {AS Body Parts:89667}  Radiation: {asradiationpain:55644}  Quality: {asquality:18032}  Intensity/Severity: {asseveritypain:00277}  Duration: {Time:94226}  Progression of symptoms: {Stucker yes no:25181}  Onset quality: {asonsetquality:42252}  Timing: {astimin}  Aggravating Factors: {AS Aggravating Factors Ortho:45745}  Alleviating Factors: {AS Alleviating Factors:44443}  Previous Episodes: {aspreviousepisodes:07240}  Associated Symptoms: {asassociatedsymptoms:73431}  ADLs Affected: {ADL ASMEH:38980}  Previous Treatment: {AS previous treatment:25871}       Objective   Vital Signs:   Ht 175.3 cm (69\")   Wt 88 kg (194 lb)   BMI 28.65 kg/m²     Physical Exam  Physical Exam  Vitals signs and nursing note reviewed.   Constitutional:       Appearance: Normal appearance.   Pulmonary:      Effort: Pulmonary effort is normal.   Skin:     General: Skin is warm and dry.      Capillary Refill: Capillary refill takes less than 2 seconds.   Neurological:      General: No focal deficit present.      Mental Status: He is alert and oriented to person, place, and time. Mental status is at baseline.   Psychiatric:         Mood and Affect: Mood normal.         Behavior: Behavior normal.         Thought Content: Thought content normal.         Judgment: Judgment normal.     Ortho Exam   {Musculoskeletal Exams:38151}    {Post Op Total Joint Arthroplasty Exam:14653}    {Post Op Detailed Exam:53723}        Result Review :{ Labs  Result Review  Imaging  Med Tab  Media :23}   The following data was reviewed by: Radha Sánchez MA on 10/29/2021:  {Data reviewed " (optional):48752}  {Diagnostics options AS:76639}            Procedures           Assessment   Assessment and Plan {CC Problem List  Visit Diagnosis  ROS  Review (Popup)  Health Maintenance  Quality  BestPractice  Medications  SmartSets  SnapShot Encounters  Media :23}   There are no diagnoses linked to this encounter.    {Time Spent (Optional):98988}  Follow Up {Instructions Charge Capture  Follow-up Communications :23}  · Compression/brace  · Rest, ice, compression, and elevation (RICE) therapy  · Stretching and strengthening exercises  · OTC {OTC pain:01885}  · Follow up in *** {WEEKS/MONTHS:63980}  • Patient was given instructions and counseling regarding his condition or for health maintenance advice. Please see specific information pulled into the AVS if appropriate.     Radha Sánchez MA   Date of Encounter: 10/29/2021   Electronically signed by Radha Sánchez MA, 10/29/21, 1:25 PM EDT.     EMR Dragon/Transcription disclaimer:  Much of this encounter note is an electronic transcription/translation of spoken language to printed text. The electronic translation of spoken language may permit erroneous, or at times, nonsensical words or phrases to be inadvertently transcribed; Although I have reviewed the note for such errors, some may still exist.

## 2021-10-29 NOTE — PROGRESS NOTES
POST OP TOTAL GLOBAL      NAME: Jim Verma           : 1960    MRN: [id number]    Chief Complaint   Patient presents with   • Right Shoulder - Pain, Fall       Date of Surgery: Patient had right reverse total shoulder arthroplasty on 2021.  ?  HPI:   Patient returns today for 6 week follow up of right reverse total shoulder arthroplasty Incision(s) healed nicely with no signs of infection. Patient reports doing well with no unusual complaints. No fevers, rigors or chills. Appears to be progressing appropriately. Patient is on appropriate anticoagulation.  He is complaining of some involuntary jerking sensation of his lower extremities.  We have encouraged him to contact his primary care physician for diagnosis of his neurological condition.  He might even need to see a neurologist for this symptom.      Ortho Exam:   Right shoulder. Patient is postoperative 6 week(s). Afebrile. Vital signs are stable. Deltopectoral incision is clean and healing well. Axillary nerve function is well preserved. There is no glenohumeral instability. No clicking, popping or catching is noted. Apprehension sign is negative. Axillary nerve function is well preserved. Radial artery pulses are palpable. There is no clinical deformity. Range of motion is flexion 0-80 degrees, abduction 0-80 degrees.      Diagnostic Studies:  RIGHT shoulder xrays  weightbearing/standing ap/lateral views were ordered by Adams Perez MD. Performed at [facility] on 10/15/2021. Images were independently viewed and interpreted by myself, my impression as follows:      right Shoulder X-Ray  Indication: Evaluation of implant position after reverse total shoulder arthroplasty  AP and Lateral  Findings: Excellent position of the implants with a good press-fit profile without any subsidence of the implants.  no bony lesion  Soft tissues within normal limits  within normal limits joint spaces  Hardware appropriately  positioned yes      yes prior studies available for comparison.    X-RAY was ordered and reviewed by Adams Perez MD      Assessment:  Diagnoses and all orders for this visit:    1. S/P reverse total shoulder arthroplasty, right (Primary)            Plan     · To use ACE wrap/RADHA stocking  · Continue ice to joint   · Stretching and strengthening exercises of the abductors and flexors.  · Dislocation precautions.  · , GI and dental procedure prophylaxis with antibiotics to prevent metastatic infection of the shoulder arthroplasty implants.  · Note for physical therapy given to the patient to follow the reverse total shoulder arthroplasty protocols.  · Aggressive ROM  · Falls precautions and dislocation precautions.  · Tablet Norco 5/325 mg tab 1 p.o. every 12 as needed pain total 30 pills no refills.  · You may now resume any prescription medications you were taking prior to surgery  · Continue with lifelong antibiotic prophylaxis with dental procedures following total joint replacement.  · Follow up in 6 week(s)  · Controlled substance treatment options discussed in detail. Patient's signed consent to medical options on file. MARISOL form in chart.  The patient is being provided this narcotic prescription to address the acute medical condition that they are undergoing/experiencing at this time.  It is my medical and surgical assessment that more than 3 days of narcotic medication is necessary to help control the pain and discomfort that this patient is experiencing at this point.  Risks of narcotic medication usage outlined.  Possibility of physical and psychological dependence and abuse, especially long term, emphasized to the patient.  I have explained to the patient that we will try to wean them off the narcotic medication as soon as possible and introduce non-narcotic modalities of pain control.  At this point in the patient's clinical spectrum, however, alternative available treatments are inadequate to  control their pain and symptoms.  I have also discussed the possibility of random drug testing as well as pill counts to prevent misuse and misappropriation of the narcotic medications prescribed to the patient.    Date of encounter: 10/29/2021   Adams Perez MD

## 2021-11-22 ENCOUNTER — HOSPITAL ENCOUNTER (OUTPATIENT)
Dept: GENERAL RADIOLOGY | Facility: HOSPITAL | Age: 61
Discharge: HOME | End: 2021-11-22
Admitting: ORTHOPAEDIC SURGERY

## 2021-11-22 ENCOUNTER — TELEPHONE (OUTPATIENT)
Dept: ORTHOPEDIC SURGERY | Facility: CLINIC | Age: 61
End: 2021-11-22

## 2021-11-22 DIAGNOSIS — Z96.611 S/P REVERSE TOTAL SHOULDER ARTHROPLASTY, RIGHT: ICD-10-CM

## 2021-11-22 DIAGNOSIS — Z96.611 S/P REVERSE TOTAL SHOULDER ARTHROPLASTY, RIGHT: Primary | ICD-10-CM

## 2021-11-22 PROCEDURE — 73030 X-RAY EXAM OF SHOULDER: CPT
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
## 2021-11-23 NOTE — TELEPHONE ENCOUNTER
LEFT VOICE MAIL FOR PATIENT HE HAS BEEN SCHEDULED FOR TOMORROW, 11/24/2021, AT 2 PM.  IT IS OK FOR THE HUB TO TELL THE PATIENT HIS APPT. IF HE RETURNS THE CALL.

## 2021-11-24 ENCOUNTER — OFFICE VISIT (OUTPATIENT)
Dept: ORTHOPEDIC SURGERY | Facility: CLINIC | Age: 61
End: 2021-11-24

## 2021-11-24 VITALS — BODY MASS INDEX: 28.73 KG/M2 | HEIGHT: 69 IN | TEMPERATURE: 98.4 F | WEIGHT: 194 LBS

## 2021-11-24 DIAGNOSIS — Z96.611 S/P REVERSE TOTAL SHOULDER ARTHROPLASTY, RIGHT: Primary | ICD-10-CM

## 2021-11-24 PROCEDURE — 99024 POSTOP FOLLOW-UP VISIT: CPT | Performed by: ORTHOPAEDIC SURGERY

## 2022-02-10 ENCOUNTER — OFFICE VISIT (OUTPATIENT)
Dept: ORTHOPEDIC SURGERY | Facility: CLINIC | Age: 62
End: 2022-02-10

## 2022-02-10 VITALS — WEIGHT: 194 LBS | TEMPERATURE: 98.4 F | BODY MASS INDEX: 28.73 KG/M2 | HEIGHT: 69 IN

## 2022-02-10 DIAGNOSIS — M75.101 RIGHT ROTATOR CUFF TEAR ARTHROPATHY: ICD-10-CM

## 2022-02-10 DIAGNOSIS — M12.811 RIGHT ROTATOR CUFF TEAR ARTHROPATHY: ICD-10-CM

## 2022-02-10 DIAGNOSIS — Z96.611 S/P REVERSE TOTAL SHOULDER ARTHROPLASTY, RIGHT: Primary | ICD-10-CM

## 2022-02-10 PROCEDURE — 99213 OFFICE O/P EST LOW 20 MIN: CPT | Performed by: ORTHOPAEDIC SURGERY

## 2022-02-10 NOTE — PROGRESS NOTES
ORTHOPEDIC VISIT      NAME: Jim Verma           : 1960    MRN: [id number]    Chief Complaint   Patient presents with   • Right Shoulder - Follow-up       Date of Surgery: 2021  ?  HPI:   Patient returns today for 5 month follow up of right reverse total shoulder arthroplasty Incision(s) healed nicely with no signs of infection. Patient reports doing well with no unusual complaints. No fevers, rigors or chills. Appears to be progressing appropriately. Patient is on appropriate anticoagulation.  Unfortunately he had gotten into an injury situation postoperatively after the reverse total shoulder arthroplasty and that had made his shoulder very sore.  He now states that things have improved significantly for the better.  Range of motion has come back to near normal and overall he is pleased with his outcome.  He has been limping quite a bit because of right knee pathology.  He has difficulty going up and down the steps and difficulty with squatting on the ground.  He has a lot of medial sided joint line pain and discomfort with radiation to the tibia.  The patient states that he would like to start with intra-articular steroid injection and possibly viscosupplementation injection and eventually will need surgical intervention in the form of knee arthroplasty.      Ortho Exam:   Right shoulder. Patient is postoperative 5 month(s). Afebrile. Vital signs are stable. Deltopectoral incision is clean and healing well. Axillary nerve function is well preserved. There is no glenohumeral instability. No clicking, popping or catching is noted. Apprehension sign is negative. Axillary nerve function is well preserved. Radial artery pulses are palpable. There is no clinical deformity. Range of motion is flexion 0-130 degrees, abduction 0-130 degrees.    Right knee. Patient has crepitus throughout range of motion. Positive patellar grind test. Mild effusion. Lachman is negative. Pivot shift is negative. Anterior  and posterior drawer signs are negative. Significant joint line tenderness is noted on the medial aspect of the knee. Patient has a varus orientation of the knee. There is fullness and tenderness in the popliteal fossa. Mild distention of a popliteal cyst is noted in this location. Range of motion in flexion is from 0- 110 degrees. Neurovascular status is intact.  Dorsalis pedis and posterior tibial artery pulses are palpable. Common peroneal nerve function is well preserved. Patient's gait is cautious and antalgic. Skin and soft tissues are mildly swollen, consistent with synovitis and effusion. The patient has a significant limp with the first few steps after starting the gait cycle. Getting out of a chair takes a lot of effort due to pain on knee flexion.  Diagnostic Studies:  no diagnostic testing performed this visit        Assessment:  Diagnoses and all orders for this visit:    1. S/P reverse total shoulder arthroplasty, right (Primary)    2. Right rotator cuff tear arthropathy            Plan   · Calcium and vitamin D for bone health.  · Continue with reverse total shoulder arthroplasty protocols to improve the range of motion and strength and endurance of shoulder muscle function.  · Intra-articular steroid injection and viscosupplementation injection to the knees discussed with the patient.  · He will eventually need total knee arthroplasty based on symptom progression.  · Tablet meloxicam 7.5 mg tab 1 p.o. nightly for pain swelling and discomfort.  · , GI and dental procedure prophylaxis with antibiotics to prevent metastatic infection of the shoulder arthroplasty implants.  · Falls and dislocation precautions for the implants discussed with patient.  · To use ACE wrap/RADHA stocking  · Continue ice to joint   · Stretching and strengthening exercises of the flexors and abductors of the shoulder.  · Aggressive ROM  · Falls precautions  · You may now resume any prescription medications you were taking prior  to surgery  · Continue with lifelong antibiotic prophylaxis with dental procedures following total joint replacement.  · Follow up in 1 year(s) with x-rays    Date of encounter: 02/10/2022   Adams Perez MD

## 2022-02-10 NOTE — PROGRESS NOTES
POST OP TOTAL GLOBAL      NAME: Jim Verma           : 1960    MRN: [id number]    Chief Complaint   Patient presents with   • Right Shoulder - Pain, Post-op       Date of Surgery: ***  ?  HPI:   Patient returns today for *** {Time; day/week/month:87482} follow up of {Post op anatomical location:71353} {AS Incision/arthroscopic portals:67672} {Incision Healin}. Patient reports doing well with no unusual complaints. No fevers, rigors or chills. Appears to be progressing appropriately. Patient is on appropriate anticoagulation.       Ortho Exam:   {Post Op Total Joint Arthroplasty:93900}      Diagnostic Studies:  {Diagnostics options AS:21676}        Assessment:  There are no diagnoses linked to this encounter.        Plan   · Incision care  · To use ACE wrap/RADHA stocking  · Continue ice to joint   · Stretching and strengthening exercises  · {AS Expected ROM:70413}  · Falls precautions  · {anticoagulation guidelines post op AS:75583}  · Continue with lifelong antibiotic prophylaxis with dental procedures following total joint replacement.  · Follow up in *** {TIME; DAY/WK/MO/YR(S):75169}    Date of encounter: 02/10/2022   Charlene Sanders MA

## 2022-02-11 DIAGNOSIS — M25.561 RIGHT KNEE PAIN, UNSPECIFIED CHRONICITY: Primary | ICD-10-CM

## 2022-02-15 ENCOUNTER — HOSPITAL ENCOUNTER (OUTPATIENT)
Dept: GENERAL RADIOLOGY | Facility: HOSPITAL | Age: 62
Discharge: HOME | End: 2022-02-15

## 2022-02-15 ENCOUNTER — OFFICE VISIT (OUTPATIENT)
Dept: ORTHOPEDIC SURGERY | Facility: CLINIC | Age: 62
End: 2022-02-15

## 2022-02-15 VITALS — HEIGHT: 69 IN | TEMPERATURE: 98.5 F | BODY MASS INDEX: 29.62 KG/M2 | WEIGHT: 200 LBS

## 2022-02-15 DIAGNOSIS — M17.11 PRIMARY OSTEOARTHRITIS OF RIGHT KNEE: Primary | ICD-10-CM

## 2022-02-15 DIAGNOSIS — Z96.611 S/P REVERSE TOTAL SHOULDER ARTHROPLASTY, RIGHT: ICD-10-CM

## 2022-02-15 DIAGNOSIS — M25.561 RIGHT KNEE PAIN, UNSPECIFIED CHRONICITY: ICD-10-CM

## 2022-02-15 PROCEDURE — 73562 X-RAY EXAM OF KNEE 3: CPT

## 2022-02-15 PROCEDURE — 99214 OFFICE O/P EST MOD 30 MIN: CPT | Performed by: PHYSICIAN ASSISTANT

## 2022-02-15 PROCEDURE — 73030 X-RAY EXAM OF SHOULDER: CPT

## 2022-02-15 PROCEDURE — 20610 DRAIN/INJ JOINT/BURSA W/O US: CPT | Performed by: PHYSICIAN ASSISTANT

## 2022-02-15 RX ADMIN — METHYLPREDNISOLONE ACETATE 160 MG: 80 INJECTION, SUSPENSION INTRA-ARTICULAR; INTRALESIONAL; INTRAMUSCULAR; SOFT TISSUE at 13:51

## 2022-02-15 RX ADMIN — LIDOCAINE HYDROCHLORIDE 2 ML: 10 INJECTION, SOLUTION EPIDURAL; INFILTRATION; INTRACAUDAL; PERINEURAL at 13:51

## 2022-02-15 NOTE — PROGRESS NOTES
"Chief Complaint  Pain and Establish Care of the Right Knee    Subjective    History of Present Illness      Jim Verma is a 62 y.o. male who presents to [facility] for new complaint of right knee pain. He reports the knee pain is severe and describes it as a constant aching pain. Standing and increased activity increases pain, rest does help. He states the knee has been giving out frequently. The pain is felt at the medial and patellofemoral compartment.     He also reports feeling like \"something in his shoulder is moving\" on the side he had replaced. He would like to do a little more PT but wants to make sure the implants look okay and that he is not going to hurt anything.      Objective   Vital Signs:   Temp 98.5 °F (36.9 °C)   Ht 175.3 cm (69\")   Wt 90.7 kg (200 lb)   BMI 29.53 kg/m²     Physical Exam  Vitals signs and nursing note reviewed.   Constitutional:       Appearance: Normal appearance.   Pulmonary:      Effort: Pulmonary effort is normal.   Skin:     General: Skin is warm and dry.      Capillary Refill: Capillary refill takes less than 2 seconds.   Neurological:      General: No focal deficit present.      Mental Status: He is alert and oriented to person, place, and time. Mental status is at baseline.   Psychiatric:         Mood and Affect: Mood normal.         Behavior: Behavior normal.         Thought Content: Thought content normal.         Judgment: Judgment normal.     Ortho Exam   RIGHT knee  There is moderate joint line tenderness at the medial aspect of the knee.   Positive for varus orientation of the knee.   Positive for crepitus throughout range of motion.   Negative for effusion.  Positive patellar grind test.   Negative Lachman test.    Negative anterior and posterior drawer.  Range of motion in extension and flexion is: 0-115 degrees.  Neurovascular status is intact.    Dorsalis pedis and posterior tibial artery pulses are palpable.    Common " peroneal nerve function is well preserved.  Gait is cautious and antalgic.      Result Review :   Radiologic studies - see below for interpretation  RIGHT knee xrays  weightbearing/standing 3 views were ordered by Ha Burger PA-C. Performed at [facility] on 2/15/2022. Images were independently viewed and interpreted by myself, my impression as follows:  Findings: Moderate medial compartment and patellofemoral compartment narrowing, lateral compartment with mild degenerative change  Bony lesion: no  Soft tissues: within normal limits  Joint spaces: decreased  Hardware appropriately positioned: not applicable  Prior studies available for comparison: no     RIGHT shoulder xrays   4 views were ordered by Ha Burger PA-C. Performed at [facility] on 2/15/2022. Images were independently viewed and interpreted by myself, my impression as follows:   Findings: expected postoperative appearance, moderate ac joint degenerative changes  Bony lesion: no  Soft tissues: within normal limits  Joint spaces: see above  Hardware appropriately positioned: yes  Prior studies available for comparison: yes          PROCEDURE  Large Joint Arthrocentesis: R knee  Date/Time: 2/15/2022 1:51 PM  Consent given by: patient  Site marked: site marked  Timeout: Immediately prior to procedure a time out was called to verify the correct patient, procedure, equipment, support staff and site/side marked as required   Supporting Documentation  Indications: pain and joint swelling   Procedure Details  Location: knee - R knee  Preparation: Patient was prepped and draped in the usual sterile fashion  Needle size: 22 G  Approach: anteromedial  Medications administered: 160 mg methylPREDNISolone acetate 80 MG/ML; 2 mL lidocaine PF 1% 1 %  Patient tolerance: patient tolerated the procedure well with no immediate complications                 Assessment   Assessment and Plan    Problem List Items  Addressed This Visit        Musculoskeletal and Injuries    S/P reverse total shoulder arthroplasty, right    Relevant Orders    XR Shoulder 2+ View Right (Completed)    Primary osteoarthritis of right knee - Primary    Relevant Orders    Large Joint Arthrocentesis: R knee (Completed)    Visco Treatment        2  Follow Up   · Discussion of any imaging in detail. Discussion of orthopaedic goals.  · Risk, benefits, and merits of treatment alternatives reviewed with the patient. Treatment alternatives include: oral anti-inflammatories/NSAID, intra-articular steroid injection, intra-articular visco supplementation, bracing and eventual surgery  · Ice, heat, and/or modalities as beneficial  · Risks of minor surgical procedure/intra-articular injection, including but not limited to pain, bleeding, infection into the joint, pigment skin changes related to steroid administration, increased blood sugar levels secondary to steroid administration, scar, recurrence of pain, and tendon rupture associated with steroid administration and possible need for further surgery reviewed with patient.  He accepts these risks and wishes to proceed with procedure. Injected patient's right knee joint(s) with Depo-Medrol from a(n) anteromedial approach.  Patient tolerated the procedure well and no complications were encountered.   · Watch for signs and symptoms of infection  · Call or notify for any adverse effect from injection therapy  · Reassurance regarding his shoulder, I suspect he is feeling the arthritis of the ac joint. He has been advised he can proceed with PT on the shoulder.  · Patient is encouraged to call or return for any issues or concerns.  · Follow up in 3 months  • Patient was given instructions and counseling regarding his condition or for health maintenance advice. Please see specific information pulled into the AVS if appropriate.     Ha Burger PA-C   Date of Encounter: 2/15/2022   Electronically signed by  Ha Burger PA-C, 02/28/22, 6:51 AM EST.      EMR Dragon/Transcription disclaimer:  Much of this encounter note is an electronic transcription/translation of spoken language to printed text. The electronic translation of spoken language may permit erroneous, or at times, nonsensical words or phrases to be inadvertently transcribed; Although I have reviewed the note for such errors, some may still exist.

## 2022-02-16 ENCOUNTER — TELEPHONE (OUTPATIENT)
Dept: ORTHOPEDIC SURGERY | Facility: CLINIC | Age: 62
End: 2022-02-16

## 2022-02-16 DIAGNOSIS — Z96.611 S/P REVERSE TOTAL SHOULDER ARTHROPLASTY, RIGHT: Primary | ICD-10-CM

## 2022-02-16 NOTE — PROGRESS NOTES
Will you advise him the xray of the shoulder is normal in terms of the replacement. He does have arthritis at the AC joint, which I think is what he is feeling. He is fine to proceed with PT.

## 2022-02-16 NOTE — TELEPHONE ENCOUNTER
I CALLED AND SPOKE WITH THE PATIENT AND GAVE HIM THE BELOW MESSAGE FROM HA, HE WILL LET US KNOW IF HE NEEDS A NEW PT ORDER FAXED     ----- Message from Ha Burger PA-C sent at 2/16/2022  8:30 AM EST -----  Will you advise him the xray of the shoulder is normal in terms of the replacement. He does have arthritis at the AC joint, which I think is what he is feeling. He is fine to proceed with PT.

## 2022-02-28 PROBLEM — M17.11 PRIMARY OSTEOARTHRITIS OF RIGHT KNEE: Status: ACTIVE | Noted: 2022-02-28

## 2022-02-28 RX ORDER — METHYLPREDNISOLONE ACETATE 80 MG/ML
160 INJECTION, SUSPENSION INTRA-ARTICULAR; INTRALESIONAL; INTRAMUSCULAR; SOFT TISSUE
Status: COMPLETED | OUTPATIENT
Start: 2022-02-15 | End: 2022-02-15

## 2022-02-28 RX ORDER — LIDOCAINE HYDROCHLORIDE 10 MG/ML
2 INJECTION, SOLUTION EPIDURAL; INFILTRATION; INTRACAUDAL; PERINEURAL
Status: COMPLETED | OUTPATIENT
Start: 2022-02-15 | End: 2022-02-15

## 2022-03-03 ENCOUNTER — TELEPHONE (OUTPATIENT)
Dept: ORTHOPEDIC SURGERY | Facility: CLINIC | Age: 62
End: 2022-03-03

## 2022-03-03 NOTE — TELEPHONE ENCOUNTER
PATIENT CALLED AND STATED THAT THE CORTISONE INJECTION THAT HE RECEIVED ON 2/15/22 HAS NOT HELPED.  HE WAS ALSO CHECKING TO SEE IF WE HAVE HEARD FROM HIS INSURANCE COMPANY REGARDING GEL INJECTION

## 2022-03-04 NOTE — TELEPHONE ENCOUNTER
Okay, I see you forwarded to Gretchen too. She probably has not attempted it yet since he had a steroid and his next appt is in May. Maybe she will be able to go ahead and check.

## 2022-03-10 ENCOUNTER — TELEPHONE (OUTPATIENT)
Dept: ORTHOPEDIC SURGERY | Facility: CLINIC | Age: 62
End: 2022-03-10

## 2022-03-10 NOTE — TELEPHONE ENCOUNTER
Provider: JOHNATHON  Caller: PAO  Phone Number: [phone]    Reason for Call: PT IS ASKING FOR A RETURN TO WORK LETTER HIS DATE TO RETURN IS MARCH THE 20TH.      PT WILL STOP BY TO PICK IT AS SOON AS WE HAVE IT WRITTEN

## 2022-03-11 NOTE — TELEPHONE ENCOUNTER
It is okay to release the patient to go back to work.  Go ahead and give him a note.  He needs to be very careful with upper extremity lifting and repetitive work.  There are no specific, formal restrictions on him as such.

## 2022-03-11 NOTE — TELEPHONE ENCOUNTER
Are you okay with the patient returning to work on 03/20/22 and does he have any restrictions? Please advise

## 2022-03-14 NOTE — TELEPHONE ENCOUNTER
PATIENT CALLED CHECKING STATUS OF WORK NOTE.  WORK NOTE HAS BEEN PRINTED FOR  BY PATIENT AND PATIENT WAS ADVISED TO BE VERY CAREFUL WITH UPPER EXTREMITY LIFTING AND REPETITIVE WORK PER DR. DIEGO.  HE EXPRESSED UNDERSTANDING

## 2022-03-18 ENCOUNTER — TELEPHONE (OUTPATIENT)
Dept: ORTHOPEDIC SURGERY | Facility: CLINIC | Age: 62
End: 2022-03-18

## 2022-03-18 NOTE — TELEPHONE ENCOUNTER
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  HIS WORK NOTE NEEDS TO INCLUDE PLEASE OTHERWISE HE IS EXPECTED TO DO HIS JOB WITH NO LIMITATIONS.  PATIENT WOULD LIKE TO  REVISED RETURN TO WORK NOTE AT THE [facility].       NOTE PER PATIENT'S CHART     \"CAREFUL WITH UPPER EXTREMITY LIFTING AND REPETITIVE  WORK\"  "

## 2022-04-11 ENCOUNTER — TELEPHONE (OUTPATIENT)
Dept: ORTHOPEDIC SURGERY | Facility: CLINIC | Age: 62
End: 2022-04-11

## 2022-04-14 ENCOUNTER — OFFICE VISIT (OUTPATIENT)
Dept: ORTHOPEDIC SURGERY | Facility: CLINIC | Age: 62
End: 2022-04-14

## 2022-04-14 VITALS — BODY MASS INDEX: 29.62 KG/M2 | HEIGHT: 69 IN | TEMPERATURE: 97.2 F | WEIGHT: 200 LBS

## 2022-04-14 DIAGNOSIS — Z96.611 S/P REVERSE TOTAL SHOULDER ARTHROPLASTY, RIGHT: Primary | ICD-10-CM

## 2022-04-14 PROCEDURE — 99213 OFFICE O/P EST LOW 20 MIN: CPT | Performed by: ORTHOPAEDIC SURGERY

## 2022-04-14 RX ORDER — PAROXETINE 10 MG/1
10 TABLET, FILM COATED ORAL DAILY
COMMUNITY
Start: 2022-01-13 | End: 2022-12-21

## 2022-04-14 RX ORDER — SERTRALINE HYDROCHLORIDE 25 MG/1
25 TABLET, FILM COATED ORAL DAILY
COMMUNITY
Start: 2022-03-18 | End: 2022-12-21

## 2022-04-14 RX ORDER — CLONAZEPAM 0.5 MG/1
TABLET ORAL
COMMUNITY
Start: 2022-03-03

## 2022-04-14 NOTE — PROGRESS NOTES
"Chief Complaint  Follow-up and Pain of the Right Shoulder    Subjective    History of Present Illness      Jim Verma is a 62 y.o. male who presents to [facility] for follow-up on right reverse total shoulder arthroplasty.  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  This surgery was performed by me on 20 September 2021.  He has actually done very well postoperatively.  His range of motion improved significantly and is now near normal.  Physical therapy was very helpful for the patient in resorting to a normal range of motion.  He still is lacking some strength especially in abduction.  The patient states that he has gone back to work and that has increased his pain profile.  He has a very heavy, physically demanding blue-collar job.  There is a specific activity and with there is a very heavy downward motion which increases the pain and discomfort.  We have counseled the patient aggressively about career options and his job modifications to live with the limitations of a reverse total shoulder arthroplasty.  Pain Location: RIGHT shoulder  Radiation: none  Quality: dull, aching  Intensity/Severity: moderate  Duration: 6-7 months  Progression of symptoms: no worsening, symptoms stable/unchanged  Onset quality: gradual   Timing: intermittent  Aggravating Factors: reaching forward, reaching across body, rotating motion, repetitive motion  Alleviating Factors: NSAIDs  Previous Episodes: yes  Associated Symptoms: pain, swelling  ADLs Affected: work related activities  Previous Treatment: prior surgery       Objective   Vital Signs:   Temp 97.2 °F (36.2 °C)   Ht 175.3 cm (69\")   Wt 90.7 kg (200 lb)   BMI 29.53 kg/m²     Physical Exam  Physical Exam  Vitals signs and nursing note reviewed.   Constitutional:       Appearance: Normal appearance. "   Pulmonary:      Effort: Pulmonary effort is normal.   Skin:     General: Skin is warm and dry.      Capillary Refill: Capillary refill takes less than 2 seconds.   Neurological:      General: No focal deficit present.      Mental Status: He is alert and oriented to person, place, and time. Mental status is at baseline.   Psychiatric:         Mood and Affect: Mood normal.         Behavior: Behavior normal.         Thought Content: Thought content normal.         Judgment: Judgment normal.     Ortho Exam     Right shoulder. Patient is postoperative 7 month(s). Afebrile. Vital signs are stable. Deltopectoral incision is clean and healing well. Axillary nerve function is well preserved. There is no glenohumeral instability. No clicking, popping or catching is noted. Apprehension sign is negative. Axillary nerve function is well preserved. Radial artery pulses are palpable. There is no clinical deformity. Range of motion is flexion 0-120 degrees, abduction 0-120 degrees.          Result Review :   The following data was reviewed by: Adams Perez MD on 04/14/2022:                Procedures           Assessment   Assessment and Plan    Diagnoses and all orders for this visit:    1. S/P reverse total shoulder arthroplasty, right (Primary)          Follow Up   · Work modifications discussed with the patient to minimize the repetitive trauma to the upper extremity and to minimize symptoms related to the reverse total shoulder arthroplasty.  · Calcium and vitamin D for bone health.  · Tablet meloxicam 7.5 mg tab 1 p.o. nightly for pain swelling and discomfort.  · The patient is requesting 2 days off work and I think that is a very reasonable request so we will go ahead and give him that time off work.  · Falls and dislocation precautions discussed with the patient.  · , GI and dental procedure prophylaxis with antibiotics to prevent metastatic infection of the shoulder arthroplasty implants.  · Rest, ice, compression, and  elevation (RICE) therapy  · Stretching and strengthening exercises of the flexors and abductors of the shoulder.  · OTC Tylenol 500-1000mg by mouth every 6 hours as needed for pain   · Follow up in 3-4 month(s)  • Patient was given instructions and counseling regarding his condition or for health maintenance advice. Please see specific information pulled into the AVS if appropriate.     Adams Perez MD   Date of Encounter: 4/14/2022       EMR Dragon/Transcription disclaimer:  Much of this encounter note is an electronic transcription/translation of spoken language to printed text. The electronic translation of spoken language may permit erroneous, or at times, nonsensical words or phrases to be inadvertently transcribed; Although I have reviewed the note for such errors, some may still exist.

## 2022-04-19 ENCOUNTER — TELEPHONE (OUTPATIENT)
Dept: ORTHOPEDIC SURGERY | Facility: CLINIC | Age: 62
End: 2022-04-19

## 2022-04-19 NOTE — TELEPHONE ENCOUNTER
Caller: PATIENT    Relationship: SELF     Best call back number: [phone]    What form or medical record are you requesting: WORK NOTE STATING PATIENT WOULD BENEFIT FROM HAVING A JOB THAT IS NOT AS PHYSICALLY HARD DUE TO HIS RECENT SHOULDER SURGERY AND KNEE PAIN.    Who is requesting this form or medical record from you: PATIENT    How would you like to receive the form or medical records (pick-up, mail, fax): If pick-up, provide patient with address and location details-  FROM THE [facility].    Timeframe paperwork needed: ASAP    Additional notes: PATIENT CALLED TO REQUEST A WORK NOTE MADE OUT TO: ODIN GALINDO STATING HE WOULD BENEFIT FROM A JOB THAT IS NOT AS PHYSICALLY HARD DUE TO HIS RECENT SHOULDER SURGERY AND KNEE PAIN. PATIENT STATED HE ACCEPTED A JOB THAT IS NOT AS PHYSICALLY HARD AND THEY ARE REQUESTING A NOTE STATING HE COULD BENEFIT FROM THAT. IF YOU HAVE ANY QUESTIONS ABOUT THIS NOTE YOU CAN REACH OUT TO THE PATIENT. PATIENT WOULD LIKE TO  THE WORK NOTE WHEN IT IS READY. THANK YOU!

## 2022-04-22 NOTE — TELEPHONE ENCOUNTER
PATIENT CAME IN OFFICE CHECKING ON THIS REQUEST AGAIN.    PATIENT IS ASKING IF DR. DIEGO WILL GIVE HIM A NOTE THAT STATES THAT HE WOULD BENEFIT FROM A JOB THAT IS NOT AS PHYSICALLY DEMANDING AS HIS CURRENT JOB DUE TO HIS RIGHT REVERSE TOTAL SHOULDER REPLACEMENT AND BILATERAL KNEE PAIN.    IS IT OK TO GIVE PATIENT NOTE FOR WORK?

## 2022-04-22 NOTE — TELEPHONE ENCOUNTER
Yes please go ahead and give him that note.  That is very reasonable for him to request that note status post reverse total shoulder arthroplasty.  Thank you

## 2022-05-03 ENCOUNTER — TELEPHONE (OUTPATIENT)
Dept: ORTHOPEDIC SURGERY | Facility: CLINIC | Age: 62
End: 2022-05-03

## 2022-05-03 NOTE — TELEPHONE ENCOUNTER
Caller: Bayron Jim FERMIN    Relationship: Self    Best call back number: [phone]    What form or medical record are you requesting: SHORT TERM DISABILITY     Who is requesting this form or medical record from you: EMPLOYER    How would you like to receive the form or medical records (pick-up, mail, fax):      Timeframe paperwork needed: ASAP    Additional notes: MR. MARY CALLED STATING THAT HE WOULD LIKE TO  THE SHORT TERM DISABILITY PAPERWORK AT THE  WHEN IT IS READY. HE STATED HE JUST DROPPED IT OFF YESTERDAY, AND UNDERSTANDS IT MAY NOT BE DONE YET. PLEASE CALL HIM WHEN READY FOR .

## 2022-05-10 ENCOUNTER — TELEPHONE (OUTPATIENT)
Dept: ORTHOPEDIC SURGERY | Facility: CLINIC | Age: 62
End: 2022-05-10

## 2022-05-10 NOTE — TELEPHONE ENCOUNTER
Provider: ALINE BROUSSARD    Caller: PAO MARY    Relationship to Patient: SELF    Phone Number: [phone]    Reason for Call:PATIENT WANTED TO MAKE THE OFFICE AWARE THAT HE WILL BE DROPPING OFF NEW SHORT TERM DISABILITY PAPERWORK OF AT THE  ON 05/11/22. PLEASE CALL HIM WHEN IT'S READY FOR PICK-UP.

## 2022-05-16 ENCOUNTER — CLINICAL SUPPORT (OUTPATIENT)
Dept: ORTHOPEDIC SURGERY | Facility: CLINIC | Age: 62
End: 2022-05-16

## 2022-05-16 VITALS — TEMPERATURE: 98.4 F | WEIGHT: 200 LBS | HEIGHT: 69 IN | BODY MASS INDEX: 29.62 KG/M2

## 2022-05-16 DIAGNOSIS — M17.11 PRIMARY OSTEOARTHRITIS OF RIGHT KNEE: Primary | ICD-10-CM

## 2022-05-16 PROCEDURE — 20610 DRAIN/INJ JOINT/BURSA W/O US: CPT | Performed by: PHYSICIAN ASSISTANT

## 2022-05-16 RX ORDER — LIDOCAINE HYDROCHLORIDE 10 MG/ML
2 INJECTION, SOLUTION INFILTRATION; PERINEURAL
Status: COMPLETED | OUTPATIENT
Start: 2022-05-16 | End: 2022-05-16

## 2022-05-16 RX ORDER — METHYLPREDNISOLONE ACETATE 80 MG/ML
160 INJECTION, SUSPENSION INTRA-ARTICULAR; INTRALESIONAL; INTRAMUSCULAR; SOFT TISSUE
Status: COMPLETED | OUTPATIENT
Start: 2022-05-16 | End: 2022-05-16

## 2022-05-16 RX ADMIN — LIDOCAINE HYDROCHLORIDE 2 ML: 10 INJECTION, SOLUTION INFILTRATION; PERINEURAL at 14:32

## 2022-05-16 RX ADMIN — METHYLPREDNISOLONE ACETATE 160 MG: 80 INJECTION, SUSPENSION INTRA-ARTICULAR; INTRALESIONAL; INTRAMUSCULAR; SOFT TISSUE at 14:32

## 2022-05-16 NOTE — PROGRESS NOTES
"Chief Complaint  Follow-up and Pain of the Right Knee    Subjective    History of Present Illness      Jim Verma is a 62 y.o. male who presents to [facility] for injection therapy of right knee pain. He reports moderate improvement of the knee with last steroid injection but does not feel it lasted very long. It appears his insurance may have denied monovisc a few months ago but I will enter request for visco again with hopes that it may be covered through speciality or it may cover a different brand.      Objective   Vital Signs:   Temp 98.4 °F (36.9 °C)   Ht 175.3 cm (69\")   Wt 90.7 kg (200 lb)   BMI 29.53 kg/m²     Physical Exam  Vitals signs and nursing note reviewed.   Constitutional:       Appearance: Normal appearance.   Pulmonary:      Effort: Pulmonary effort is normal.   Skin:     General: Skin is warm and dry.      Capillary Refill: Capillary refill takes less than 2 seconds.   Neurological:      General: No focal deficit present.      Mental Status: He is alert and oriented to person, place, and time. Mental status is at baseline.   Psychiatric:         Mood and Affect: Mood normal.         Behavior: Behavior normal.         Thought Content: Thought content normal.         Judgment: Judgment normal.     Ortho Exam   RIGHT knee  There is moderate joint line tenderness at the medial aspect of the knee.   Positive for varus orientation of the knee.   Positive for crepitus throughout range of motion.   Negative for effusion.  Positive patellar grind test.   Negative Lachman test.    Negative anterior and posterior drawer.  Range of motion in extension and flexion is: 0-115 degrees.  Neurovascular status is intact.    Dorsalis pedis and posterior tibial artery pulses are palpable.    Common peroneal nerve function is well preserved.  Gait is cautious.      Result Review :           PROCEDURE  Large Joint Arthrocentesis: R knee  Date/Time: 5/16/2022 2:32 PM  Consent given " by: patient  Site marked: site marked  Timeout: Immediately prior to procedure a time out was called to verify the correct patient, procedure, equipment, support staff and site/side marked as required   Supporting Documentation  Indications: pain   Procedure Details  Location: knee - R knee  Preparation: Patient was prepped and draped in the usual sterile fashion  Needle size: 25 G  Approach: anteromedial  Medications administered: 160 mg methylPREDNISolone acetate 80 MG/ML; 2 mL lidocaine 1 %  Patient tolerance: patient tolerated the procedure well with no immediate complications                 Assessment   Assessment and Plan    Problem List Items Addressed This Visit        Musculoskeletal and Injuries    Primary osteoarthritis of right knee - Primary    Relevant Orders    Large Joint Arthrocentesis: R knee    Visco Treatment          Follow Up   · Risks of minor surgical procedure/intra-articular injection, including but not limited to pain, bleeding, infection into the joint, pigment skin changes related to steroid administration, increased blood sugar levels secondary to steroid administration, scar, recurrence of pain, and tendon rupture associated with steroid administration and possible need for further surgery reviewed with patient.  He accepts these risks and wishes to proceed with procedure. Injected patient's right knee joint(s) with Depo-Medrol from a(n) anteromedial approach.  Patient tolerated the procedure well and no complications were encountered.   · Watch for signs and symptoms of infection  · Call or notify for any adverse effect from injection therapy  · Patient is encouraged to call or return for any issues or concerns.  · Follow up in 3 months for visco or steroid, depending on insurance approval.  • Patient was given instructions and counseling regarding his condition or for health maintenance advice. Please see specific information pulled into the AVS if appropriate.     Ha Rodriguez  LOW Burger   Date of Encounter: 5/16/2022      Electronically signed by Ha Burger PA-C, 05/16/22, 3:02 PM EDT.   EMR Dragon/Transcription disclaimer:  Much of this encounter note is an electronic transcription/translation of spoken language to printed text. The electronic translation of spoken language may permit erroneous, or at times, nonsensical words or phrases to be inadvertently transcribed; Although I have reviewed the note for such errors, some may still exist.

## 2022-07-11 DIAGNOSIS — M17.11 PRIMARY OSTEOARTHRITIS OF RIGHT KNEE: Primary | ICD-10-CM

## 2022-07-22 DIAGNOSIS — Z96.611 S/P REVERSE TOTAL SHOULDER ARTHROPLASTY, RIGHT: Primary | ICD-10-CM

## 2022-07-28 ENCOUNTER — OFFICE VISIT (OUTPATIENT)
Dept: ORTHOPEDIC SURGERY | Facility: CLINIC | Age: 62
End: 2022-07-28

## 2022-07-28 ENCOUNTER — HOSPITAL ENCOUNTER (OUTPATIENT)
Dept: GENERAL RADIOLOGY | Facility: HOSPITAL | Age: 62
Discharge: HOME | End: 2022-07-28
Admitting: ORTHOPAEDIC SURGERY

## 2022-07-28 VITALS — HEIGHT: 69 IN | WEIGHT: 200 LBS | TEMPERATURE: 98.6 F | BODY MASS INDEX: 29.62 KG/M2

## 2022-07-28 DIAGNOSIS — M17.11 PRIMARY OSTEOARTHRITIS OF RIGHT KNEE: ICD-10-CM

## 2022-07-28 DIAGNOSIS — Z96.611 S/P REVERSE TOTAL SHOULDER ARTHROPLASTY, RIGHT: ICD-10-CM

## 2022-07-28 DIAGNOSIS — Z96.611 S/P REVERSE TOTAL SHOULDER ARTHROPLASTY, RIGHT: Primary | ICD-10-CM

## 2022-07-28 PROCEDURE — 99214 OFFICE O/P EST MOD 30 MIN: CPT | Performed by: ORTHOPAEDIC SURGERY

## 2022-07-28 PROCEDURE — 73030 X-RAY EXAM OF SHOULDER: CPT

## 2022-07-29 ENCOUNTER — CLINICAL SUPPORT (OUTPATIENT)
Dept: ORTHOPEDIC SURGERY | Facility: CLINIC | Age: 62
End: 2022-07-29

## 2022-07-29 VITALS — WEIGHT: 196.1 LBS | BODY MASS INDEX: 29.04 KG/M2 | HEIGHT: 69 IN | TEMPERATURE: 97.6 F

## 2022-07-29 DIAGNOSIS — M17.11 PRIMARY OSTEOARTHRITIS OF RIGHT KNEE: Primary | ICD-10-CM

## 2022-07-29 PROCEDURE — 20610 DRAIN/INJ JOINT/BURSA W/O US: CPT | Performed by: PHYSICIAN ASSISTANT

## 2022-07-29 NOTE — PROGRESS NOTES
"Chief Complaint  Follow-up and Pain of the Left Knee    Subjective    History of Present Illness      Jim Verma is a 62 y.o. male who presents to [facility] for injection therapy of right knee pain. He reports moderate improvement of the knee with last steroid injection but does not feel it lasted very long.Today I am administering a first time Monovisc Injection in the knee.     Objective   Vital Signs:   Temp 97.6 °F (36.4 °C)   Ht 175.3 cm (69\")   Wt 89 kg (196 lb 1.6 oz)   BMI 28.96 kg/m²     Physical Exam  62 y.o. male is awake, alert, oriented, in no acute distress and well developed, well nourished.  Ortho Exam   RIGHT knee  There is moderate joint line tenderness at the medial aspect of the knee.   Positive for varus orientation of the knee.   Positive for crepitus throughout range of motion.   Negative for effusion.  Positive patellar grind test.   Negative Lachman test.    Negative anterior and posterior drawer.  Range of motion in extension and flexion is: 0-115 degrees.  Neurovascular status is intact.    Dorsalis pedis and posterior tibial artery pulses are palpable.    Common peroneal nerve function is well preserved.  Gait is cautious.        Result Review :           PROCEDURE  Large Joint Arthrocentesis: R knee  Date/Time: 7/29/2022 10:07 AM  Consent given by: patient  Site marked: site marked  Timeout: Immediately prior to procedure a time out was called to verify the correct patient, procedure, equipment, support staff and site/side marked as required   Supporting Documentation  Indications: pain   Procedure Details  Location: knee - R knee  Preparation: Patient was prepped and draped in the usual sterile fashion  Needle size: 25 G  Approach: anteromedial  Medications administered: 88 mg Hyaluronan 88 MG/4ML; 2 mL lidocaine (cardiac)  Patient tolerance: patient tolerated the procedure well with no immediate complications             Injection site was " identified by physical examination and cleaned with Betadine and alcohol swabs. Prior to needle insertion, ethyl chloride spray was used for surface anesthesia. Sterile technique was used.       Assessment   Assessment and Plan    Problem List Items Addressed This Visit        Musculoskeletal and Injuries    Primary osteoarthritis of right knee - Primary    Relevant Orders    Large Joint Arthrocentesis: R knee    Visco Treatment          Follow Up   • Right knee Monovisc injection was discussed with the patient. Discussed indication, risks, benefits, and alternatives. Verbal consent was given to proceed with the procedure.   • Injection was performed from anteromedial approach.  Patient tolerated the procedure well and no complications were encountered.  • Discussion of orthopedic goals and activities and patient/guardian expressed understanding.  • Ice, heat, rest, compression and elevation of extremity as beneficial  • nsaids and/or tylenol as beneficial  • Instructed to refrain from heavy activity/rest the extremity for the next 24-48 hours  • Discussion regarding possibility of cortisol flare and what to expect if this occurs  • Watch for signs and symptoms of infection  • Call if adverse effect from injection therapy  • Follow up in 6 months unless it does not provide relief after 2-4 weeks of administration  • Patient was given instructions and counseling regarding his condition or for health maintenance advice. Please see specific information pulled into the AVS if appropriate.     Ha Burger PA-C   Date of Encounter: 7/29/2022   Electronically signed by Ha Burger PA-C, 07/29/22, 4:25 PM EDT.     EMR Dragon/Transcription disclaimer:  Much of this encounter note is an electronic transcription/translation of spoken language to printed text. The electronic translation of spoken language may permit erroneous, or at times, nonsensical words or phrases to be inadvertently transcribed; Although  I have reviewed the note for such errors, some may still exist.

## 2022-08-09 NOTE — PROGRESS NOTES
"Chief Complaint  Follow-up of the Right Shoulder and Follow-up of the Right Knee    Subjective    History of Present Illness      Jim Verma is a 62 y.o. male who presents to [facility] for follow-up on right reverse total shoulder arthroplasty and severe right knee pain and discomfort.  History of Present Illness this patient underwent a right reverse total shoulder on 20 September 2021.  He had severe cuff tear arthropathy at that point.  He has done extremely well with physical therapy.  He still has some pain and discomfort but it is very minimal and very tolerable.  He states that he has been using his shoulder for activities of daily living without too much difficulty.  He is pleased with his overall outcome.  He states that his right knee has not bothered him significantly.  He has difficulty in walking on uneven surfaces and difficulty going up and down the steps.  Occasionally the knee will buckle and give out from underneath him.  There is some radiation of pain to the proximal part of his tibia.  There is no doubt in my mind that he is going to need knee replacement surgery in the near future.  Pain Location: RIGHT shoulder and right knee  Radiation: none  Quality: dull, aching  Intensity/Severity: varies between mild and severe  Duration: several months  Progression of symptoms: yes, progressive worsening  Onset quality: gradual   Timing: intermittent  Aggravating Factors: rising after sitting, squatting  Alleviating Factors: NSAIDs  Previous Episodes: yes  Associated Symptoms: pain, swelling  ADLs Affected: grooming/hygiene/toileting/personal care, ambulating, recreational activities/sports  Previous Treatment: NSAIDs, physical/occupational therapy, intra-articular injection and prior surgery       Objective   Vital Signs:   Temp 98.6 °F (37 °C)   Ht 175.3 cm (69\")   Wt 90.7 kg (200 lb)   BMI 29.53 kg/m²     Physical Exam  Physical Exam  Vitals signs and nursing " note reviewed.   Constitutional:       Appearance: Normal appearance.   Pulmonary:      Effort: Pulmonary effort is normal.   Skin:     General: Skin is warm and dry.      Capillary Refill: Capillary refill takes less than 2 seconds.   Neurological:      General: No focal deficit present.      Mental Status: He is alert and oriented to person, place, and time. Mental status is at baseline.   Psychiatric:         Mood and Affect: Mood normal.         Behavior: Behavior normal.         Thought Content: Thought content normal.         Judgment: Judgment normal.     Ortho Exam   Right knee (varus). Patient has crepitus throughout range of motion. Positive patellar grind test. Mild effusion. Lachman is negative. Pivot shift is negative. Anterior and posterior drawer signs are negative. Significant joint line tenderness is noted on the medial aspect of the knee. Patient has a varus orientation of the knee. There is fullness and tenderness in the Popliteal fossa. Mild distention of a Popliteal cyst is noted in this location. Range of motion in flexion is from 0-110 degrees. Neurovascular status is intact.  Dorsalis pedis and posterior tibial artery pulses are palpable. Common peroneal nerve function is well preserved. Patient's gait is cautious and antalgic. Skin and soft tissues are mildly swollen, consistent with synovitis and effusion. The patient has a significant limp with the first few steps after starting the gait cycle. Getting out of a chair takes a lot of effort due to pain on knee flexion.     Right shoulder. Patient is postoperative 10 month(s). Afebrile. Vital signs are stable. Deltopectoral incision is clean and healing well. Axillary nerve function is well preserved. There is no glenohumeral instability. No clicking, popping or catching is noted. Apprehension sign is negative. Axillary nerve function is well preserved. Radial artery pulses are palpable. There is no clinical deformity. Range of motion is  flexion 0-120 degrees, abduction 0-120 degrees.      Result Review :   The following data was reviewed by: Adams Perez MD on 07/28/2022:  Radiologic studies - see below for interpretation  RIGHT shoulder xrays  weightbearing/standing ap/lateral views were ordered by Adams Perez MD. Performed at [facility] on 07/28/2022. Images were independently viewed and interpreted by myself, my impression as follows:    right Shoulder X-Ray  Indication: Evaluation of implant position after reverse total shoulder arthroplasty.  AP and Lateral  Findings: Excellent position of the implants with a good press-fit profile without any subsidence of the implants.  no bony lesion  Soft tissues within normal limits  within normal limits joint spaces  Hardware appropriately positioned yes      yes prior studies available for comparison.    X-RAY was ordered and reviewed by Adams Perez MD            Procedures           Assessment   Assessment and Plan    Diagnoses and all orders for this visit:    1. S/P reverse total shoulder arthroplasty, right (Primary)    2. Primary osteoarthritis of right knee          Follow Up   · Compression/brace to the knee to prevent it from buckling and giving out.  · Calcium and vitamin D for bone health.  · Glucosamine, chondroitin and turmeric for cartilage health.  · Intra-articular steroid injection to the knee joint discussed and offered to the patient.  We also discussed viscosupplementation as an option for treatment nonsurgically.  · , GI and dental procedure prophylaxis to prevent metastatic infection to the reverse total shoulder arthroplasty implants.  · Falls and dislocation precautions discussed with the patient.  · Risks and benefits of total knee arthroplasty discussed with the patient at length and he will let me know when he is ready for that form of surgical intervention based on symptom progression.  The patient was seen today for preoperative discussion.  The  patient has been tried on over-the-counter and prescription NSAID's despite the risks of anti-inflammatory bleeding, peptic ulcers and erosive gastritis with short term benefit only.  Braces have been prescribed for mechanical support.  Patient has been participating in an exercise program specifically targeting joint pain relief with limited benefit. Intraarticular injections have been used periodically with some but not complete relief of pain.  Ambulation aids have also been utilized.      · The details of the surgical procedure were explained including the location of probable incisions and a description of the likely hardware/grafts to be used. The patient understands the likely convalescence after surgery as well as the rehabilitation required.  Also, we have thoroughly discussed with the patient the risks, benefits and alternatives to surgery.  Risks include but are not limited to the risk of infection, joint stiffness, limited range of motion, wound healing problems, scar tissue build up, myocardial infarction, stroke, blood clots (including DVT and/or pulmonary embolus along with the risk of death) neurologic and/or vascular injury, limb length discrepancy, fracture, dislocation, nonunion, malunion, continued pain and need for further surgery including hardware failure requiring revision.   · Rest, ice, compression, and elevation (RICE) therapy  · Stretching and strengthening exercises  · OTC Alternate Ibuprofen and Tylenol as needed  · Follow up in 6 month(s)  • Patient was given instructions and counseling regarding his condition or for health maintenance advice. Please see specific information pulled into the AVS if appropriate.     Adams Perez MD   Date of Encounter: 7/28/2022   Electronically signed by Adams Perez MD, 07/28/22, 2:33 PM EDT.     EMR Dragon/Transcription disclaimer:  Much of this encounter note is an electronic transcription/translation of spoken language to printed text. The  electronic translation of spoken language may permit erroneous, or at times, nonsensical words or phrases to be inadvertently transcribed; Although I have reviewed the note for such errors, some may still exist.

## 2022-08-31 ENCOUNTER — TELEPHONE (OUTPATIENT)
Dept: UROLOGY | Facility: CLINIC | Age: 62
End: 2022-08-31

## 2022-08-31 NOTE — TELEPHONE ENCOUNTER
Provider: DR SYKES  Caller: PAO MARY  Relationship to Patient: SELF     Phone Number: [phone]  Reason for Call: PT WAS SCHEDULED TO SEE CURT KIRBY TODAY IN [place], PT UNABLE TO MAKE IT AND NEEDED TO RESCHEDULE. NO APPTS IN [place] UNTIL February 2023. PT AGREED TO GO TO [facility], RESCHEDULED PT W/DR SYKES FOR 11-21-22 @2:15PM, PT ADDED TO WAITLIST.

## 2022-09-06 ENCOUNTER — TELEPHONE (OUTPATIENT)
Dept: ORTHOPEDIC SURGERY | Facility: CLINIC | Age: 62
End: 2022-09-06

## 2022-09-06 NOTE — TELEPHONE ENCOUNTER
Provider: DR DIEGO     Caller: PAO MARY    Relationship to Patient: SELF     Phone Number: [phone]    Reason for Call: PATIENT CALLED AND STATED THAT HE NEEDS TO BE SEEN SO HE CAN KEEP HIS INSURANCE AT WORK WOULD LIKE A CALL BACK PLEASE ADVISE

## 2022-09-06 NOTE — TELEPHONE ENCOUNTER
PATIENT CALLED AND IS WANTING DR. DIEGO TO EXTEND HIS TIME OFF WORK.  HIS CURRENT SHORT TERM DISABILITY HAS HIM WITH AN ESTIMATED RETURN TO WORK DATE OF 11/01/22.  HE IS ASKING THAT DR. DIEGO EXTEND HIS TIME OFF TO 4/01/23.    PATIENT STATED THAT HIS SHOULDER IS NOT % AND HIS RIGHT KNEE IS KEEPING HIM FROM RETURNING TO WORK AS WELL.    PATIENT IS S/P RIGHT REVERSE TOTAL SHOULDER REPLACEMENT ON 9/20/21 AND RECEIVED A MONOVISC INJECTION IN HIS RIGHT KNEE ON 7/29/22    IS IT OK TO EXTEND HIS TIME OFF WORK TO 4/01/23?

## 2022-09-28 ENCOUNTER — TELEPHONE (OUTPATIENT)
Dept: ORTHOPEDIC SURGERY | Facility: CLINIC | Age: 62
End: 2022-09-28

## 2022-09-28 NOTE — TELEPHONE ENCOUNTER
Provider: BOBO  Caller: PAO  Phone Number: [phone]  Reason for Call: *PATIENT IS CALL TO GET SCHEDULE FOR   RIGHT KNEE   INJECTIONS

## 2022-10-03 ENCOUNTER — CLINICAL SUPPORT (OUTPATIENT)
Dept: ORTHOPEDIC SURGERY | Facility: CLINIC | Age: 62
End: 2022-10-03

## 2022-10-03 DIAGNOSIS — M17.11 PRIMARY OSTEOARTHRITIS OF RIGHT KNEE: Primary | ICD-10-CM

## 2022-10-03 PROCEDURE — 20610 DRAIN/INJ JOINT/BURSA W/O US: CPT | Performed by: ORTHOPAEDIC SURGERY

## 2022-10-03 RX ADMIN — LIDOCAINE HYDROCHLORIDE 2 ML: 20 INJECTION, SOLUTION EPIDURAL; INFILTRATION; INTRACAUDAL; PERINEURAL at 15:09

## 2022-10-03 RX ADMIN — METHYLPREDNISOLONE ACETATE: 80 INJECTION, SUSPENSION INTRA-ARTICULAR; INTRALESIONAL; INTRAMUSCULAR; SOFT TISSUE at 15:09

## 2022-10-03 NOTE — PROGRESS NOTES
Chief Complaint  Follow-up of the Right Knee    Subjective    History of Present Illness      Jim Verma is a 62 y.o. male who presents to [facility] for Patient returns today for right knee pain.  His pain is located over the medial and lateral aspect of the joint.  The pain has been progressive in nature and remains intermittent .  His pain is worsened by kneeling. There has been improvement in the past with heat.       Objective   Vital Signs:   There were no vitals taken for this visit.    Physical Exam  62 y.o. male is awake, alert, oriented, in no acute distress and well developed, well nourished.  Ortho Exam   RIGHT knee  Right knee (varus). Patient has crepitus throughout range of motion. Positive patellar grind test. Mild effusion. Lachman is negative. Pivot shift is negative. Anterior and posterior drawer signs are negative. Significant joint line tenderness is noted on the medial aspect of the knee. Patient has a varus orientation of the knee. There is fullness and tenderness in the Popliteal fossa. Mild distention of a Popliteal cyst is noted in this location. Range of motion in flexion is from 0-110 degrees. Neurovascular status is intact.  Dorsalis pedis and posterior tibial artery pulses are palpable. Common peroneal nerve function is well preserved. Patient's gait is cautious and antalgic. Skin and soft tissues are mildly swollen, consistent with synovitis and effusion. The patient has a significant limp with the first few steps after starting the gait cycle. Getting out of a chair takes a lot of effort due to pain on knee flexion.       Result Review :                  - Large Joint Arthrocentesis: R knee on 10/3/2022 3:09 PM  Details: 25 G needle, anteromedial approach  Medications: 2 mL lidocaine PF 2% 2 %; methylPREDNISolone acetate 80 MG/ML  Outcome: tolerated well, no immediate complications  Consent was given by the patient. Patient was prepped and draped in  the usual sterile fashion.                Assessment   Assessment and Plan    Problem List Items Addressed This Visit        Musculoskeletal and Injuries    Primary osteoarthritis of right knee - Primary       Follow Up   · Injected patient's right knee joint(s)with Depo-Medrol from an anteromedial approach   · Compression/brace   · Rest, ice, compression, and elevation (RICE) therapy  · OTC Ibuprofen 600mg by mouth every 6-8 hours as needed for pain and swelling  · Follow up in 3 month(s)  • Patient was given instructions and counseling regarding his condition or for health maintenance advice. Please see specific information pulled into the AVS if appropriate.     Adams Perez MD   Date of Encounter: 10/3/2022       EMR Dragon/Transcription disclaimer:  Much of this encounter note is an electronic transcription/translation of spoken language to printed text. The electronic translation of spoken language may permit erroneous, or at times, nonsensical words or phrases to be inadvertently transcribed; Although I have reviewed the note for such errors, some may still exist.

## 2022-10-15 RX ORDER — LIDOCAINE HYDROCHLORIDE 20 MG/ML
2 INJECTION, SOLUTION EPIDURAL; INFILTRATION; INTRACAUDAL; PERINEURAL
Status: COMPLETED | OUTPATIENT
Start: 2022-10-03 | End: 2022-10-03

## 2022-10-15 RX ORDER — METHYLPREDNISOLONE ACETATE 80 MG/ML
INJECTION, SUSPENSION INTRA-ARTICULAR; INTRALESIONAL; INTRAMUSCULAR; SOFT TISSUE
Status: COMPLETED | OUTPATIENT
Start: 2022-10-03 | End: 2022-10-03

## 2022-11-17 ENCOUNTER — TELEPHONE (OUTPATIENT)
Dept: UROLOGY | Facility: CLINIC | Age: 62
End: 2022-11-17

## 2022-11-17 NOTE — TELEPHONE ENCOUNTER
LVM letting patient know we will need to reschedule his appt with our NP. He can expect a call from our scheduling ladies to reschedule this. Asked that he calls if he needs anything.

## 2022-12-21 ENCOUNTER — OFFICE VISIT (OUTPATIENT)
Dept: UROLOGY | Facility: CLINIC | Age: 62
End: 2022-12-21

## 2022-12-21 VITALS — WEIGHT: 197.6 LBS | RESPIRATION RATE: 12 BRPM | HEIGHT: 69 IN | BODY MASS INDEX: 29.27 KG/M2

## 2022-12-21 DIAGNOSIS — R32 URINARY INCONTINENCE, UNSPECIFIED TYPE: Primary | ICD-10-CM

## 2022-12-21 PROBLEM — E78.5 HYPERLIPIDEMIA: Status: ACTIVE | Noted: 2022-12-21

## 2022-12-21 PROBLEM — R25.2 MUSCLE CRAMPS: Status: ACTIVE | Noted: 2022-12-21

## 2022-12-21 PROBLEM — M54.2 NECK PAIN: Status: ACTIVE | Noted: 2022-12-21

## 2022-12-21 LAB
BILIRUB BLD-MCNC: NEGATIVE MG/DL
CLARITY, POC: CLEAR
COLOR UR: YELLOW
EXPIRATION DATE: 723
GLUCOSE UR STRIP-MCNC: NEGATIVE MG/DL
KETONES UR QL: NEGATIVE
LEUKOCYTE EST, POC: NEGATIVE
Lab: ABNORMAL
NITRITE UR-MCNC: NEGATIVE MG/ML
PH UR: 6 [PH] (ref 5–8)
PROT UR STRIP-MCNC: ABNORMAL MG/DL
RBC # UR STRIP: NEGATIVE /UL
SP GR UR: 1.02 (ref 1–1.03)
URINE VOLUME: 28
UROBILINOGEN UR QL: ABNORMAL

## 2022-12-21 PROCEDURE — 81003 URINALYSIS AUTO W/O SCOPE: CPT | Performed by: NURSE PRACTITIONER

## 2022-12-21 PROCEDURE — 51798 US URINE CAPACITY MEASURE: CPT | Performed by: NURSE PRACTITIONER

## 2022-12-21 PROCEDURE — 99214 OFFICE O/P EST MOD 30 MIN: CPT | Performed by: NURSE PRACTITIONER

## 2022-12-21 RX ORDER — DIPHENHYDRAMINE HCL 25 MG
25 CAPSULE ORAL EVERY 6 HOURS PRN
COMMUNITY

## 2022-12-21 RX ORDER — ALUMINUM ZIRCONIUM TRICHLOROHYDREX GLY 0.19 G/G
20 STICK TOPICAL DAILY
COMMUNITY
Start: 2022-10-14 | End: 2023-03-20

## 2022-12-21 RX ORDER — NEBIVOLOL 2.5 MG/1
2.5 TABLET ORAL DAILY
COMMUNITY

## 2022-12-21 RX ORDER — POTASSIUM CHLORIDE 750 MG/1
10 TABLET, EXTENDED RELEASE ORAL
COMMUNITY
Start: 2022-09-08 | End: 2023-03-20

## 2022-12-21 RX ORDER — TERAZOSIN 5 MG/1
10 CAPSULE ORAL NIGHTLY
Qty: 180 CAPSULE | Refills: 3 | Status: SHIPPED | OUTPATIENT
Start: 2022-12-21

## 2022-12-21 NOTE — PROGRESS NOTES
Chief Complaint: Urinary Incontinence (Pt here as a new pt.  Pt has to get up 2-3 times at night.  Pt sometimes has woken up at night.  During the day he has trouble holding his urine.)    Subjective         History of Present Illness  Jim Verma is a 62 y.o. male presents to [facility] to be seen for urinary incontinence.    He states that he has had an increasingly difficult time with urinary urgency frequency and urge incontinence over the last 6 months.    He has had a few episodes of nocturnal enuresis.     Nocturia x 2-3     He has been on terazosin for several years for BPH.    He states his stream is good as long as he takes his hytrin.     He denies straining to void as long as he takes terazosin.    He drinks milk water tea daily and beer at times.     He voids every 2-3 hours.     No family hx of gu malignancies.     He is a previus smoker quit over 20 years ago.      Objective     Past Medical History:   Diagnosis Date   • GERD (gastroesophageal reflux disease)    • Hyperlipidemia    • Hypertension        Past Surgical History:   Procedure Laterality Date   • KIDNEY STONE SURGERY     • SHOULDER SURGERY Right          Current Outpatient Medications:   •  amLODIPine (NORVASC) 10 MG tablet, Take 10 mg by mouth Daily., Disp: , Rfl:   •  clonazePAM (KlonoPIN) 0.5 MG tablet, TAKE 1 TABLET BY MOUTH TWICE DAILY AS NEEDED FOR PANIC OR ANXIETY, Disp: , Rfl:   •  cyclobenzaprine (FLEXERIL) 10 MG tablet, TK 1 T PO QHS FOR MUSCULAR PAIN, Disp: , Rfl:   •  diphenhydrAMINE (BENADRYL) 25 mg capsule, Take 25 mg by mouth Every 6 (Six) Hours As Needed for Itching., Disp: , Rfl:   •  FeroSul 325 (65 Fe) MG tablet, Take 1 tablet by mouth Daily., Disp: , Rfl:   •  HYDROcodone-acetaminophen (NORCO) 5-325 MG per tablet, Take 1 tablet by mouth Every 12 (Twelve) Hours As Needed for Mild Pain  or Moderate Pain ., Disp: 40 tablet, Rfl: 0  •  montelukast (SINGULAIR) 10 MG tablet, Take 10 mg by mouth  "Daily., Disp: , Rfl:   •  nebivolol (BYSTOLIC) 2.5 MG tablet, Take 2.5 mg by mouth Daily., Disp: , Rfl:   •  Omeprazole 20 MG tablet delayed-release, Take 20 mg by mouth Daily., Disp: , Rfl:   •  potassium chloride (K-DUR,KLOR-CON) 10 MEQ CR tablet, Take 10 mEq by mouth., Disp: , Rfl:   •  PrilOSEC OTC 20 MG EC tablet, Take 20 mg by mouth Daily., Disp: , Rfl:   •  rosuvastatin (CRESTOR) 10 MG tablet, Take 10 mg by mouth Daily., Disp: , Rfl:   •  terazosin (HYTRIN) 5 MG capsule, Take 2 capsules by mouth Every Night., Disp: 180 capsule, Rfl: 3  •  traMADol (ULTRAM) 50 MG tablet, Take 1 tablet by mouth At Night As Needed for Moderate Pain ., Disp: 40 tablet, Rfl: 0    Allergies   Allergen Reactions   • Ace Inhibitors Unknown - High Severity   • Metoprolol Unknown - Low Severity   • Valsartan Unknown - Low Severity        History reviewed. No pertinent family history.    Social History     Socioeconomic History   • Marital status: Single   Tobacco Use   • Smoking status: Never   • Smokeless tobacco: Never   Vaping Use   • Vaping Use: Never used   Substance and Sexual Activity   • Alcohol use: Yes     Alcohol/week: 14.0 standard drinks     Types: 14 Cans of beer per week   • Drug use: Never   • Sexual activity: Defer       Vital Signs:   Resp 12   Ht 175.3 cm (69\")   Wt 89.6 kg (197 lb 9.6 oz)   BMI 29.18 kg/m²      Physical Exam     Result Review :   The following data was reviewed by: MAURA Oneil on 12/21/2022:  Results for orders placed or performed in visit on 12/21/22   Bladder Scan   Result Value Ref Range    Urine Volume 28    POC Urinalysis Dipstick, Automated    Specimen: Urine   Result Value Ref Range    Color Yellow Yellow, Straw, Dark Yellow, Hodan    Clarity, UA Clear Clear    Specific Gravity  1.020 1.005 - 1.030    pH, Urine 6.0 5.0 - 8.0    Leukocytes Negative Negative    Nitrite, UA Negative Negative    Protein, POC Trace (A) Negative mg/dL    Glucose, UA Negative Negative mg/dL    Ketones, " UA Negative Negative    Urobilinogen, UA 0.2 E.U./dL Normal, 0.2 E.U./dL    Bilirubin Negative Negative    Blood, UA Negative Negative    Lot Number [id number]     Expiration Date 723        Bladder Scan interpretation 12/21/2022    Estimation of residual urine via BVI 3000 Verathon Bladder Scan  MA/nurse performing: Vickie QUINONES MA   Residual Urine: 28 ml  Indication: Urinary incontinence, unspecified type   Position: Supine  Examination: Incremental scanning of the suprapubic area using 2.0 MHz transducer using copious amounts of acoustic gel.   Findings: An anechoic area was demonstrated which represented the bladder, with measurement of residual urine as noted. I inspected this myself. In that the residual urine was insignificant, refer to plan for treatment and plan necessary at this time.           Procedures        Assessment and Plan    Diagnoses and all orders for this visit:    1. Urinary incontinence, unspecified type (Primary)  -     Bladder Scan  -     POC Urinalysis Dipstick, Automated  -     terazosin (HYTRIN) 5 MG capsule; Take 2 capsules by mouth Every Night.  Dispense: 180 capsule; Refill: 3    Discussed with patient at this point time I believe that increasing his terazosin dosage may gain better control of his urinary urgency and urge urinary incontinence.  He is agreeable to this and we will send in terazosin 10 mg daily.  Sign we will follow-up with him in 8 weeks or sooner if needed.  He will call the office if he is with any new or worsening symptoms          I spent 15  minutes caring for Jim on this date of service. This time includes time spent by me in the following activities:reviewing tests, obtaining and/or reviewing a separately obtained history, performing a medically appropriate examination and/or evaluation , counseling and educating the patient/family/caregiver, ordering medications, tests, or procedures, and documenting information in the medical record  Follow Up   Return in about 8  weeks (around 2/15/2023) for f/u incontinence.  Patient was given instructions and counseling regarding his condition or for health maintenance advice. Please see specific information pulled into the AVS if appropriate.         This document has been electronically signed by MAURA Oneil  December 21, 2022 14:05 EST

## 2023-02-03 ENCOUNTER — TELEPHONE (OUTPATIENT)
Dept: ORTHOPEDIC SURGERY | Facility: CLINIC | Age: 63
End: 2023-02-03
Payer: COMMERCIAL

## 2023-02-03 NOTE — TELEPHONE ENCOUNTER
CALLED [employer] TO CHECK ON PA  FOR [employer] SPOKE TO ANTONIO.   [facility] APPROVED PA# [id number]  AUTH DATES 02/03/2023-06- HB

## 2023-02-06 ENCOUNTER — TELEPHONE (OUTPATIENT)
Dept: ORTHOPEDIC SURGERY | Facility: CLINIC | Age: 63
End: 2023-02-06
Payer: COMMERCIAL

## 2023-02-06 NOTE — TELEPHONE ENCOUNTER
Caller: Jim Verma    Relationship to patient: Self    Best call back number: [phone]    Type of visit: INJECTION    Requested date: ASAP    If rescheduling, when is the original appointment: 02/06/23    Additional notes:PT WOULD LIKE TO RESCHEDULE 02/06 MISSED APPT

## 2023-02-13 ENCOUNTER — CLINICAL SUPPORT (OUTPATIENT)
Dept: ORTHOPEDIC SURGERY | Facility: CLINIC | Age: 63
End: 2023-02-13
Payer: COMMERCIAL

## 2023-02-13 VITALS — BODY MASS INDEX: 29.18 KG/M2 | HEIGHT: 69 IN | WEIGHT: 197 LBS

## 2023-02-13 DIAGNOSIS — M17.11 PRIMARY OSTEOARTHRITIS OF RIGHT KNEE: Primary | ICD-10-CM

## 2023-02-13 PROCEDURE — 20610 DRAIN/INJ JOINT/BURSA W/O US: CPT | Performed by: PHYSICIAN ASSISTANT

## 2023-02-13 RX ORDER — LIDOCAINE HYDROCHLORIDE 10 MG/ML
2 INJECTION, SOLUTION EPIDURAL; INFILTRATION; INTRACAUDAL; PERINEURAL
Status: COMPLETED | OUTPATIENT
Start: 2023-02-13 | End: 2023-02-13

## 2023-02-13 RX ADMIN — LIDOCAINE HYDROCHLORIDE 2 ML: 10 INJECTION, SOLUTION EPIDURAL; INFILTRATION; INTRACAUDAL; PERINEURAL at 11:11

## 2023-02-13 NOTE — PROGRESS NOTES
"Chief Complaint  Follow-up of the Right Knee    Subjective    History of Present Illness      Jim Verma is a 63 y.o. male who presents to [facility] for injection therapy of right knee.  He received first Monovisc injection in July 2022 and reports great relief of pain.  He states over the last several weeks he has noticed increasing pain.      Objective   Vital Signs:   Ht 175.3 cm (69\")   Wt 89.4 kg (197 lb)   BMI 29.09 kg/m²     Physical Exam  63 y.o. male is awake, alert, oriented, in no acute distress and well developed, well nourished.  Ortho Exam   RIGHT knee  There is mild joint line tenderness at the medial aspect of the knee.   Positive for varus orientation of the knee.   Positive for crepitus throughout range of motion.   Negative for effusion.  Positive patellar grind test.   Negative Lachman test.    Negative anterior and posterior drawer.  Range of motion in extension and flexion is: 0-115 degrees.  Neurovascular status is intact.    Dorsalis pedis and posterior tibial artery pulses are palpable.    Common peroneal nerve function is well preserved.  Gait is cautious.        Result Review :           PROCEDURE  Large Joint Arthrocentesis: R knee  Date/Time: 2/13/2023 11:11 AM  Consent given by: patient  Site marked: site marked  Timeout: Immediately prior to procedure a time out was called to verify the correct patient, procedure, equipment, support staff and site/side marked as required   Supporting Documentation  Indications: pain   Procedure Details  Location: knee - R knee  Preparation: Patient was prepped and draped in the usual sterile fashion  Needle size: 25 G  Approach: anteromedial  Medications administered: 88 mg Hyaluronan 88 MG/4ML; 2 mL lidocaine PF 1% 1 %  Patient tolerance: patient tolerated the procedure well with no immediate complications             Injection site was identified by physical examination and cleaned with Betadine and alcohol " swabs. Prior to needle insertion, ethyl chloride spray was used for surface anesthesia. Sterile technique was used.       Assessment   Assessment and Plan    Problem List Items Addressed This Visit        Musculoskeletal and Injuries    Primary osteoarthritis of right knee - Primary    Relevant Orders    Large Joint Arthrocentesis: R knee    Visco Treatment       Follow Up   • Right knee Monovisc injection was discussed with the patient. Discussed indication, risks, benefits, and alternatives. Verbal consent was given to proceed with the procedure.   • Injection was performed from anteromedial approach.  Patient tolerated the procedure well and no complications were encountered.  • Discussion of orthopedic goals and activities and patient/guardian expressed understanding.  • Ice, heat, rest, compression and elevation of extremity as beneficial  • nsaids and/or tylenol as beneficial  • Instructed to refrain from heavy activity/rest the extremity for the next 24-48 hours  • Discussion regarding possibility of cortisol flare and what to expect if this occurs  • Watch for signs and symptoms of infection  • Call if adverse effect from injection therapy  • Follow up in 6 months for Monovisc  • Patient was given instructions and counseling regarding his condition or for health maintenance advice. Please see specific information pulled into the AVS if appropriate.     aH Burger PA-C   Date of Encounter: 2/13/2023   Electronically signed by Ha Burger PA-C, 02/13/23, 11:48 AM EST.      EMR Dragon/Transcription disclaimer:  Much of this encounter note is an electronic transcription/translation of spoken language to printed text. The electronic translation of spoken language may permit erroneous, or at times, nonsensical words or phrases to be inadvertently transcribed; Although I have reviewed the note for such errors, some may still exist.

## 2023-02-15 ENCOUNTER — TELEPHONE (OUTPATIENT)
Dept: UROLOGY | Facility: CLINIC | Age: 63
End: 2023-02-15

## 2023-03-20 ENCOUNTER — OFFICE VISIT (OUTPATIENT)
Dept: UROLOGY | Facility: CLINIC | Age: 63
End: 2023-03-20
Payer: COMMERCIAL

## 2023-03-20 VITALS — RESPIRATION RATE: 18 BRPM | BODY MASS INDEX: 28.73 KG/M2 | WEIGHT: 194 LBS | HEIGHT: 69 IN

## 2023-03-20 DIAGNOSIS — N40.1 BENIGN PROSTATIC HYPERPLASIA WITH URINARY FREQUENCY: ICD-10-CM

## 2023-03-20 DIAGNOSIS — R35.0 BENIGN PROSTATIC HYPERPLASIA WITH URINARY FREQUENCY: ICD-10-CM

## 2023-03-20 DIAGNOSIS — R32 URINARY INCONTINENCE, UNSPECIFIED TYPE: Primary | ICD-10-CM

## 2023-03-20 PROBLEM — R23.9 RECENT SKIN CHANGES: Status: ACTIVE | Noted: 2022-12-21

## 2023-03-20 PROBLEM — F41.9 ANXIETY: Status: ACTIVE | Noted: 2021-12-13

## 2023-03-20 LAB
BILIRUB BLD-MCNC: NEGATIVE MG/DL
CLARITY, POC: CLEAR
COLOR UR: YELLOW
EXPIRATION DATE: ABNORMAL
GLUCOSE UR STRIP-MCNC: NEGATIVE MG/DL
KETONES UR QL: NEGATIVE
LEUKOCYTE EST, POC: NEGATIVE
Lab: ABNORMAL
NITRITE UR-MCNC: NEGATIVE MG/ML
PH UR: 7 [PH] (ref 5–8)
PROT UR STRIP-MCNC: NEGATIVE MG/DL
RBC # UR STRIP: ABNORMAL /UL
SP GR UR: 1.03 (ref 1–1.03)
UROBILINOGEN UR QL: ABNORMAL

## 2023-03-20 PROCEDURE — 99214 OFFICE O/P EST MOD 30 MIN: CPT | Performed by: NURSE PRACTITIONER

## 2023-03-20 PROCEDURE — 81003 URINALYSIS AUTO W/O SCOPE: CPT | Performed by: NURSE PRACTITIONER

## 2023-03-20 RX ORDER — FINASTERIDE 5 MG/1
5 TABLET, FILM COATED ORAL DAILY
Qty: 90 TABLET | Refills: 3 | Status: SHIPPED | OUTPATIENT
Start: 2023-03-20

## 2023-03-20 RX ORDER — HYDROCHLOROTHIAZIDE 12.5 MG/1
TABLET ORAL
COMMUNITY
Start: 2023-03-01

## 2023-03-20 RX ORDER — DESVENLAFAXINE 25 MG/1
TABLET, EXTENDED RELEASE ORAL
COMMUNITY
Start: 2023-03-10

## 2023-03-20 NOTE — PROGRESS NOTES
Chief Complaint: Urinary Incontinence    Subjective         History of Present Illness  Jim Verma is a 63 y.o. male presents to [facility] to be seen for f/u  urinary incontinence.    The patinet states that increasing his Terazosin to 10mg has helped with urge incontinence.     He states nocturia is about the same.     He has had no further nocturnal enuresis.    He is still with some urge incontinence.     Previous:  He states that he has had an increasingly difficult time with urinary urgency frequency and urge incontinence over the last 6 months.    He has had a few episodes of nocturnal enuresis.     Nocturia x 2-3     He has been on terazosin for several years for BPH.    He states his stream is good as long as he takes his hytrin.     He denies straining to void as long as he takes terazosin.    He drinks milk water tea daily and beer at times.     He voids every 2-3 hours.     No family hx of gu malignancies.     He is a previus smoker quit over 20 years ago.      Objective     Past Medical History:   Diagnosis Date   • GERD (gastroesophageal reflux disease)    • Hyperlipidemia    • Hypertension        Past Surgical History:   Procedure Laterality Date   • KIDNEY STONE SURGERY     • SHOULDER SURGERY Right          Current Outpatient Medications:   •  amLODIPine (NORVASC) 10 MG tablet, Take 1 tablet by mouth Daily., Disp: , Rfl:   •  clonazePAM (KlonoPIN) 0.5 MG tablet, TAKE 1 TABLET BY MOUTH TWICE DAILY AS NEEDED FOR PANIC OR ANXIETY, Disp: , Rfl:   •  cyclobenzaprine (FLEXERIL) 10 MG tablet, TK 1 T PO QHS FOR MUSCULAR PAIN, Disp: , Rfl:   •  Desvenlafaxine Succinate ER 25 MG tablet sustained-release 24 hour, , Disp: , Rfl:   •  diphenhydrAMINE (BENADRYL) 25 mg capsule, Take 1 capsule by mouth Every 6 (Six) Hours As Needed for Itching., Disp: , Rfl:   •  hydroCHLOROthiazide (HYDRODIURIL) 12.5 MG tablet, , Disp: , Rfl:   •  nebivolol (BYSTOLIC) 2.5 MG tablet, Take 1 tablet by  "mouth Daily., Disp: , Rfl:   •  Omeprazole 20 MG tablet delayed-release, Take 20 mg by mouth Daily., Disp: , Rfl:   •  rosuvastatin (CRESTOR) 10 MG tablet, Take 1 tablet by mouth Daily., Disp: , Rfl:   •  terazosin (HYTRIN) 5 MG capsule, Take 2 capsules by mouth Every Night., Disp: 180 capsule, Rfl: 3  •  traMADol (ULTRAM) 50 MG tablet, Take 1 tablet by mouth At Night As Needed for Moderate Pain ., Disp: 40 tablet, Rfl: 0  •  finasteride (PROSCAR) 5 MG tablet, Take 1 tablet by mouth Daily., Disp: 90 tablet, Rfl: 3    Allergies   Allergen Reactions   • Ace Inhibitors Unknown - High Severity   • Metoprolol Unknown - Low Severity   • Valsartan Unknown - Low Severity        History reviewed. No pertinent family history.    Social History     Socioeconomic History   • Marital status: Single   Tobacco Use   • Smoking status: Never     Passive exposure: Never   • Smokeless tobacco: Never   Vaping Use   • Vaping Use: Never used   Substance and Sexual Activity   • Alcohol use: Yes     Alcohol/week: 14.0 standard drinks     Types: 14 Cans of beer per week   • Drug use: Never   • Sexual activity: Defer       Vital Signs:   Resp 18   Ht 175.3 cm (69\")   Wt 88 kg (194 lb)   BMI 28.65 kg/m²      Physical Exam     Result Review :   The following data was reviewed by: MAURA Oneil on 3/20/2023:  Results for orders placed or performed in visit on 03/20/23   POC Urinalysis Dipstick, Automated    Specimen: Urine   Result Value Ref Range    Color Yellow Yellow, Straw, Dark Yellow, Hodan    Clarity, UA Clear Clear    Specific Gravity  1.030 1.005 - 1.030    pH, Urine 7.0 5.0 - 8.0    Leukocytes Negative Negative    Nitrite, UA Negative Negative    Protein, POC Negative Negative mg/dL    Glucose, UA Negative Negative mg/dL    Ketones, UA Negative Negative    Urobilinogen, UA 2.0 E.U./dL (A) Normal, 0.2 E.U./dL    Bilirubin Negative Negative    Blood, UA Trace (A) Negative    Lot Number [id number]     Expiration Date 4/2,024     "            Procedures        Assessment and Plan    Diagnoses and all orders for this visit:    1. Urinary incontinence, unspecified type (Primary)  -     POC Urinalysis Dipstick, Automated    2. Benign prostatic hyperplasia with urinary frequency  -     finasteride (PROSCAR) 5 MG tablet; Take 1 tablet by mouth Daily.  Dispense: 90 tablet; Refill: 3        We will order finasteride for the patient as he is still with nocturia and f/u with him in 4 months or sooner if needed.      I spent 15  minutes caring for Jim on this date of service. This time includes time spent by me in the following activities:reviewing tests, obtaining and/or reviewing a separately obtained history, performing a medically appropriate examination and/or evaluation , counseling and educating the patient/family/caregiver, ordering medications, tests, or procedures, and documenting information in the medical record  Follow Up   Return in about 4 months (around 7/20/2023) for f/u finasteride.  Patient was given instructions and counseling regarding his condition or for health maintenance advice. Please see specific information pulled into the AVS if appropriate.         This document has been electronically signed by MAURA Oneil  March 20, 2023 14:36 EDT

## 2023-04-04 ENCOUNTER — TELEPHONE (OUTPATIENT)
Dept: ORTHOPEDIC SURGERY | Facility: CLINIC | Age: 63
End: 2023-04-04

## 2023-04-04 NOTE — TELEPHONE ENCOUNTER
Caller: Jim Verma    Relationship to patient: Self    Best call back number: [phone]    Patient is needing: A NOTE TO EXTEND HIS SHORT TERM DISABILITY UNTIL 5.1.23

## 2023-07-06 NOTE — H&P (VIEW-ONLY)
"Chief Complaint  Back Pain    Subjective          Jim Verma who is a 63 y.o. year old male who presents to [facility] for Evaluation of the Spine.     The patient complains of pain located in the lumbar spine and right greater than left leg pain.  Patients states the pain has been present for several months.  The pain came on gradually.  The pain scale level is up to 10/10 in severity.  The pain  radiates to the right greater than left involving all the toes .  The pain is waxing/waning and described as sharp.  The pain is worse in the morning. Patient states prolonged standing and prolonged walking makes the pain worse.  Patient states  sitting in a soft seat  makes the pain better.    Associated Symptoms Include: Numbness, right greater than left  Conservative Interventions Include:  PT-no help (increased pain after a couple of sessions), NSAIDs, Tramadol, gabapentin    History of Previous Spinal Surgery?: No    This patient  reports that he has never smoked. He has never been exposed to tobacco smoke. He has never used smokeless tobacco.    Review of Systems   Musculoskeletal:  Positive for arthralgias, back pain and myalgias.   Neurological:  Positive for numbness.      Objective   Vital Signs:   /86 (BP Location: Left arm, Patient Position: Sitting)   Ht 175.3 cm (69\")   Wt 86.9 kg (191 lb 8 oz)   BMI 28.28 kg/mý       Physical Exam  Constitutional:       Appearance: He is normal weight.   Cardiovascular:      Comments: No edema  Pulmonary:      Effort: Pulmonary effort is normal.   Neurological:      Mental Status: He is alert.      Sensory: No sensory deficit.      Motor: No weakness.      Deep Tendon Reflexes: Reflexes normal (1/4 in BLE).   Psychiatric:         Mood and Affect: Mood normal.        Result Review :       I personally reviewed the patient's MRI scan which shows a right L4-5 synovial cyst with spinal stenosis and a small left L5-S1 disc " bulge.     Assessment and Plan    Diagnoses and all orders for this visit:    1. Synovial cyst (Primary)  Comments:  L4-5 with spinal stenosis      His best option is a right approach for L4-5 minimally invasive laminectomy with synovial cyst resection.    Risks and benefits discussed with patient.    Follow Up   No follow-ups on file.  Patient was given instructions and counseling regarding his condition or for health maintenance advice. Please see specific information pulled into the AVS if appropriate.

## 2023-07-12 PROBLEM — M71.30 SYNOVIAL CYST: Status: ACTIVE | Noted: 2023-07-12

## 2023-07-28 NOTE — PRE-PROCEDURE INSTRUCTIONS
"IMPORTANT INSTRUCTIONS - PRE-ADMISSION TESTING  DO NOT EAT OR CHEW anything after midnight the night before your procedure.    You may have CLEAR liquids up to 2_ hours prior to ARRIVAL time.   Take the following medications the morning of your procedure with JUST A SIP OF WATER: nebivolol  if needed-instructed to bring  to the hospital, finasteride, amlodipine, omeprazole, tramadol  if needed, clonazepam  if needed, desvenlafaxine if needed    DO NOT BRING your medications to the hospital with you, UNLESS something has changed since your PRE-Admission Testing appointment.  Hold all vitamins, supplements, and NSAIDS (Non- steroidal anti-inflammatory meds) for one week prior to surgery (you MAY take Tylenol or Acetaminophen).  If you are diabetic, check your blood sugar the morning of your procedure. If it is less than 70 or if you are feeling symptomatic, call the following number for further instructions: [phone]  Use your inhalers/nebulizers as usual, the morning of your procedure. BRING YOUR INHALERS with you.   Bring your CPAP or BIPAP to hospital, ONLY IF YOU WILL BE SPENDING THE NIGHT.   Make sure you have a ride home and have someone who will stay with you the day of your procedure after you go home.  If you have any questions, please call your Pre-Admission Testing NurseESTELA at [phone].   Per anesthesia request, do not smoke for 24 hours before your procedure or as instructed by your surgeon.  Clear Liquid Diet        Find out when you need to start a clear liquid diet.   Think of "clear liquids" as anything you could read a newspaper through. This includes things like water, broth, sports drinks, or tea WITHOUT any kind of milk or cream.           Once you are told to start a clear liquid diet, only drink these things until 2 hours before arrival to the hospital or when the hospital says to stop. Total volume limitation: 8 oz.       Clear liquids you CAN drink:   Water   Clear broth: " beef, chicken, vegetable, or bone broth with nothing in it   Gatorade   Lemonade or Fabian-aid   Soda   Tea, coffee (NO cream or honey)   Jell-O (without fruit)   Popsicles (without fruit or cream)   Italian ices   Juice without pulp: apple, white, grape   You may use salt, pepper, and sugar    Do NOT drink:   Milk or cream   Soy milk, almond milk, coconut milk, or other non-dairy drinks and   creamers   Milkshakes or smoothies   Tomato juice   Orange juice   Grapefruit juice   Cream soups or any other than broth         Clear Liquid Diet:  Do NOT eat any solid food.  Do NOT eat or suck on mints or candy.  Do NOT chew gum.  Do NOT drink thick liquids like milk or juice with pulp in it.  Do NOT add milk, cream, or anything like soy milk or almond milk to coffee or tea.

## 2023-07-31 ENCOUNTER — ANESTHESIA EVENT (OUTPATIENT)
Dept: PERIOP | Facility: HOSPITAL | Age: 63
End: 2023-07-31
Payer: COMMERCIAL

## 2023-08-02 ENCOUNTER — HOSPITAL ENCOUNTER (OUTPATIENT)
Facility: HOSPITAL | Age: 63
Setting detail: HOSPITAL OUTPATIENT SURGERY
Discharge: HOME | End: 2023-08-02
Attending: NEUROLOGICAL SURGERY | Admitting: NEUROLOGICAL SURGERY
Payer: COMMERCIAL

## 2023-08-02 ENCOUNTER — APPOINTMENT (OUTPATIENT)
Dept: GENERAL RADIOLOGY | Facility: HOSPITAL | Age: 63
End: 2023-08-02
Payer: COMMERCIAL

## 2023-08-02 ENCOUNTER — ANESTHESIA (OUTPATIENT)
Dept: PERIOP | Facility: HOSPITAL | Age: 63
End: 2023-08-02
Payer: COMMERCIAL

## 2023-08-02 VITALS
WEIGHT: 184.3 LBS | TEMPERATURE: 97 F | HEART RATE: 75 BPM | RESPIRATION RATE: 18 BRPM | SYSTOLIC BLOOD PRESSURE: 152 MMHG | BODY MASS INDEX: 27.3 KG/M2 | OXYGEN SATURATION: 96 % | DIASTOLIC BLOOD PRESSURE: 86 MMHG | HEIGHT: 69 IN

## 2023-08-02 DIAGNOSIS — M71.30 SYNOVIAL CYST: ICD-10-CM

## 2023-08-02 LAB
ANION GAP SERPL CALCULATED.3IONS-SCNC: 14.2 MMOL/L (ref 5–15)
BUN SERPL-MCNC: 11 MG/DL (ref 8–23)
BUN/CREAT SERPL: 11.2 (ref 7–25)
CALCIUM SPEC-SCNC: 10 MG/DL (ref 8.6–10.5)
CHLORIDE SERPL-SCNC: 102 MMOL/L (ref 98–107)
CO2 SERPL-SCNC: 23.8 MMOL/L (ref 22–29)
CREAT SERPL-MCNC: 0.98 MG/DL (ref 0.76–1.27)
EGFRCR SERPLBLD CKD-EPI 2021: 86.1 ML/MIN/1.73
GLUCOSE SERPL-MCNC: 130 MG/DL (ref 65–99)
POTASSIUM SERPL-SCNC: 3.3 MMOL/L (ref 3.5–5.2)
QT INTERVAL: 408 MS
SODIUM SERPL-SCNC: 140 MMOL/L (ref 136–145)

## 2023-08-02 PROCEDURE — 93005 ELECTROCARDIOGRAM TRACING: CPT | Performed by: NEUROLOGICAL SURGERY

## 2023-08-02 PROCEDURE — 25010000002 HYDROMORPHONE 1 MG/ML SOLUTION: Performed by: NURSE ANESTHETIST, CERTIFIED REGISTERED

## 2023-08-02 PROCEDURE — 69990 MICROSURGERY ADD-ON: CPT | Performed by: NEUROLOGICAL SURGERY

## 2023-08-02 PROCEDURE — 80048 BASIC METABOLIC PNL TOTAL CA: CPT | Performed by: NEUROLOGICAL SURGERY

## 2023-08-02 PROCEDURE — 25010000002 METHYLPREDNISOLONE PER 40 MG: Performed by: NEUROLOGICAL SURGERY

## 2023-08-02 PROCEDURE — 25010000002 CEFAZOLIN IN DEXTROSE 2000 MG/ 100 ML SOLUTION: Performed by: NEUROLOGICAL SURGERY

## 2023-08-02 PROCEDURE — 25010000002 ONDANSETRON PER 1 MG: Performed by: NURSE ANESTHETIST, CERTIFIED REGISTERED

## 2023-08-02 PROCEDURE — 63267 EXCISE INTRSPINL LESION LMBR: CPT | Performed by: NEUROLOGICAL SURGERY

## 2023-08-02 PROCEDURE — 76000 FLUOROSCOPY <1 HR PHYS/QHP: CPT

## 2023-08-02 PROCEDURE — 25010000002 PROPOFOL 10 MG/ML EMULSION: Performed by: NURSE ANESTHETIST, CERTIFIED REGISTERED

## 2023-08-02 PROCEDURE — 25010000002 DEXAMETHASONE PER 1 MG: Performed by: NURSE ANESTHETIST, CERTIFIED REGISTERED

## 2023-08-02 PROCEDURE — 25010000002 SUGAMMADEX 200 MG/2ML SOLUTION: Performed by: NURSE ANESTHETIST, CERTIFIED REGISTERED

## 2023-08-02 PROCEDURE — 25010000002 FENTANYL CITRATE (PF) 50 MCG/ML SOLUTION: Performed by: NURSE ANESTHETIST, CERTIFIED REGISTERED

## 2023-08-02 PROCEDURE — 25010000002 MIDAZOLAM PER 1MG: Performed by: ANESTHESIOLOGY

## 2023-08-02 PROCEDURE — 63267 EXCISE INTRSPINL LESION LMBR: CPT | Performed by: SPECIALIST/TECHNOLOGIST, OTHER

## 2023-08-02 RX ORDER — PROPOFOL 10 MG/ML
VIAL (ML) INTRAVENOUS AS NEEDED
Status: DISCONTINUED | OUTPATIENT
Start: 2023-08-02 | End: 2023-08-02 | Stop reason: SURG

## 2023-08-02 RX ORDER — MEPERIDINE HYDROCHLORIDE 25 MG/ML
12.5 INJECTION INTRAMUSCULAR; INTRAVENOUS; SUBCUTANEOUS
Status: DISCONTINUED | OUTPATIENT
Start: 2023-08-02 | End: 2023-08-02 | Stop reason: HOSPADM

## 2023-08-02 RX ORDER — CEFAZOLIN SODIUM 2 G/100ML
2 INJECTION, SOLUTION INTRAVENOUS ONCE
Status: COMPLETED | OUTPATIENT
Start: 2023-08-02 | End: 2023-08-02

## 2023-08-02 RX ORDER — MIDAZOLAM HYDROCHLORIDE 2 MG/2ML
2 INJECTION, SOLUTION INTRAMUSCULAR; INTRAVENOUS ONCE
Status: COMPLETED | OUTPATIENT
Start: 2023-08-02 | End: 2023-08-02

## 2023-08-02 RX ORDER — DEXAMETHASONE SODIUM PHOSPHATE 4 MG/ML
INJECTION, SOLUTION INTRA-ARTICULAR; INTRALESIONAL; INTRAMUSCULAR; INTRAVENOUS; SOFT TISSUE AS NEEDED
Status: DISCONTINUED | OUTPATIENT
Start: 2023-08-02 | End: 2023-08-02 | Stop reason: SURG

## 2023-08-02 RX ORDER — ROCURONIUM BROMIDE 10 MG/ML
INJECTION, SOLUTION INTRAVENOUS AS NEEDED
Status: DISCONTINUED | OUTPATIENT
Start: 2023-08-02 | End: 2023-08-02 | Stop reason: SURG

## 2023-08-02 RX ORDER — PROMETHAZINE HYDROCHLORIDE 12.5 MG/1
25 TABLET ORAL ONCE AS NEEDED
Status: DISCONTINUED | OUTPATIENT
Start: 2023-08-02 | End: 2023-08-02 | Stop reason: HOSPADM

## 2023-08-02 RX ORDER — SODIUM CHLORIDE, SODIUM LACTATE, POTASSIUM CHLORIDE, CALCIUM CHLORIDE 600; 310; 30; 20 MG/100ML; MG/100ML; MG/100ML; MG/100ML
9 INJECTION, SOLUTION INTRAVENOUS CONTINUOUS PRN
Status: DISCONTINUED | OUTPATIENT
Start: 2023-08-02 | End: 2023-08-02 | Stop reason: HOSPADM

## 2023-08-02 RX ORDER — LIDOCAINE HYDROCHLORIDE 20 MG/ML
INJECTION, SOLUTION EPIDURAL; INFILTRATION; INTRACAUDAL; PERINEURAL AS NEEDED
Status: DISCONTINUED | OUTPATIENT
Start: 2023-08-02 | End: 2023-08-02 | Stop reason: SURG

## 2023-08-02 RX ORDER — ONDANSETRON 2 MG/ML
INJECTION INTRAMUSCULAR; INTRAVENOUS AS NEEDED
Status: DISCONTINUED | OUTPATIENT
Start: 2023-08-02 | End: 2023-08-02 | Stop reason: SURG

## 2023-08-02 RX ORDER — METHYLPREDNISOLONE ACETATE 40 MG/ML
INJECTION, SUSPENSION INTRA-ARTICULAR; INTRALESIONAL; INTRAMUSCULAR; SOFT TISSUE AS NEEDED
Status: DISCONTINUED | OUTPATIENT
Start: 2023-08-02 | End: 2023-08-02 | Stop reason: HOSPADM

## 2023-08-02 RX ORDER — ONDANSETRON 2 MG/ML
4 INJECTION INTRAMUSCULAR; INTRAVENOUS ONCE AS NEEDED
Status: DISCONTINUED | OUTPATIENT
Start: 2023-08-02 | End: 2023-08-02 | Stop reason: HOSPADM

## 2023-08-02 RX ORDER — ACETAMINOPHEN 500 MG
1000 TABLET ORAL ONCE
Status: COMPLETED | OUTPATIENT
Start: 2023-08-02 | End: 2023-08-02

## 2023-08-02 RX ORDER — PROMETHAZINE HYDROCHLORIDE 25 MG/1
25 SUPPOSITORY RECTAL ONCE AS NEEDED
Status: DISCONTINUED | OUTPATIENT
Start: 2023-08-02 | End: 2023-08-02 | Stop reason: HOSPADM

## 2023-08-02 RX ORDER — MAGNESIUM HYDROXIDE 1200 MG/15ML
LIQUID ORAL AS NEEDED
Status: DISCONTINUED | OUTPATIENT
Start: 2023-08-02 | End: 2023-08-02 | Stop reason: HOSPADM

## 2023-08-02 RX ORDER — HYDROCODONE BITARTRATE AND ACETAMINOPHEN 5; 325 MG/1; MG/1
1 TABLET ORAL EVERY 6 HOURS PRN
Qty: 20 TABLET | Refills: 0 | Status: SHIPPED | OUTPATIENT
Start: 2023-08-02

## 2023-08-02 RX ORDER — OXYCODONE HYDROCHLORIDE AND ACETAMINOPHEN 5; 325 MG/1; MG/1
1 TABLET ORAL EVERY 4 HOURS PRN
Status: DISCONTINUED | OUTPATIENT
Start: 2023-08-02 | End: 2023-08-02 | Stop reason: HOSPADM

## 2023-08-02 RX ORDER — FENTANYL CITRATE 50 UG/ML
INJECTION, SOLUTION INTRAMUSCULAR; INTRAVENOUS AS NEEDED
Status: DISCONTINUED | OUTPATIENT
Start: 2023-08-02 | End: 2023-08-02 | Stop reason: SURG

## 2023-08-02 RX ADMIN — FENTANYL CITRATE 50 MCG: 50 INJECTION, SOLUTION INTRAMUSCULAR; INTRAVENOUS at 11:40

## 2023-08-02 RX ADMIN — SODIUM CHLORIDE, POTASSIUM CHLORIDE, SODIUM LACTATE AND CALCIUM CHLORIDE 9 ML/HR: 600; 310; 30; 20 INJECTION, SOLUTION INTRAVENOUS at 09:08

## 2023-08-02 RX ADMIN — CEFAZOLIN SODIUM 2 G: 2 INJECTION, SOLUTION INTRAVENOUS at 11:55

## 2023-08-02 RX ADMIN — PROPOFOL 200 MG: 10 INJECTION, EMULSION INTRAVENOUS at 11:40

## 2023-08-02 RX ADMIN — LIDOCAINE HYDROCHLORIDE 100 MG: 20 INJECTION, SOLUTION EPIDURAL; INFILTRATION; INTRACAUDAL; PERINEURAL at 11:40

## 2023-08-02 RX ADMIN — ONDANSETRON 4 MG: 2 INJECTION INTRAMUSCULAR; INTRAVENOUS at 12:38

## 2023-08-02 RX ADMIN — ACETAMINOPHEN 1000 MG: 500 TABLET ORAL at 09:13

## 2023-08-02 RX ADMIN — DEXAMETHASONE SODIUM PHOSPHATE 4 MG: 4 INJECTION, SOLUTION INTRAMUSCULAR; INTRAVENOUS at 11:51

## 2023-08-02 RX ADMIN — SODIUM CHLORIDE, POTASSIUM CHLORIDE, SODIUM LACTATE AND CALCIUM CHLORIDE: 600; 310; 30; 20 INJECTION, SOLUTION INTRAVENOUS at 12:49

## 2023-08-02 RX ADMIN — FENTANYL CITRATE 25 MCG: 50 INJECTION, SOLUTION INTRAMUSCULAR; INTRAVENOUS at 11:51

## 2023-08-02 RX ADMIN — SUGAMMADEX 200 MG: 100 INJECTION, SOLUTION INTRAVENOUS at 12:38

## 2023-08-02 RX ADMIN — MIDAZOLAM HYDROCHLORIDE 2 MG: 1 INJECTION, SOLUTION INTRAMUSCULAR; INTRAVENOUS at 11:02

## 2023-08-02 RX ADMIN — ROCURONIUM BROMIDE 50 MG: 50 INJECTION INTRAVENOUS at 11:40

## 2023-08-02 RX ADMIN — HYDROMORPHONE HYDROCHLORIDE 0.25 MG: 1 INJECTION, SOLUTION INTRAMUSCULAR; INTRAVENOUS; SUBCUTANEOUS at 13:13

## 2023-08-02 RX ADMIN — FENTANYL CITRATE 25 MCG: 50 INJECTION, SOLUTION INTRAMUSCULAR; INTRAVENOUS at 12:37

## 2023-08-02 NOTE — DISCHARGE INSTRUCTIONS
DISCHARGE INSTRUCTIONS  DISCECTOMY/ LAMINECTOMY  [] MINIMALLY INVASIVE      For your surgery you had:  General anesthesia (you may have a sore throat for the first 24 hours)  Local anesthesia  You received a medicated patch for nausea prevention today (behind your ear). It is recommended that you remove it 24-48 hours post-operatively. It must be removed within 72 hours.   You may experience dizziness, drowsiness, or light-headedness for several hours following surgery  Do not stay alone today or tonight.  Limit your activity for 24 hours.  You should not drive, operate machinery, drink alcohol, or sign legally binding documents for 24 hours or while you are taking pain medication.    Activity  For the first two weeks following surgery, remain close to home and do not do any lifting, bending or strenuous activity.  Walking is the best exercise and you can increase the amount you walk as you feel like it.  Riding in a car for short distances (15-20 minutes) is okay but do not drive until you are off all narcotic medications.  If you have a sedentary job, you may resume work as soon as you feel like it (this is rarely less than one week).    Pain Control  Take your pain medicines as needed and as prescribed.  As you are feeling better, you can decrease the prescribed pain medication and take over-the-counter medications such as Tylenol or Ibuprofen.  The prescribed pain medicines tend to be constipating so it is important to eat a well-balanced diet and take a stool softener if recommended by the doctor.  Activity such as walking also helps keep your bowels regular.   Incision Care  Your sutures are under the skin and will dissolve in time.  You may shower as soon as you like.    [x] You have a clear dressing, which you should remove in 3-4 days.  Beneath this dressing are steri-strips that will peel off over time.  If these steri-strips have not peeled off in 10-14 days, you may remove them.  Gently washing your  incision with mild soap and rinsing with water during your shower is all you need to do to care for the incision.  Do not scrub the incision or sit in the bathtub.  Check your incision site each day to see how it looks.  Some redness and bruising is normal for the first few days.    NOTIFY YOUR DOCTOR IF YOU EXPERIENCE ANY OF THE FOLLOWING:   You have a fever over 100.8o Fahrenheit orally  Shaking chills  Your incision is red, hot to touch, or has excessive drainage  Your incision starts to separate  Increase in bleeding or bleeding that is excessive  Nausea, vomiting and/or pain not controlled by prescribed medications  Unable to urinate in 6 hours after surgery  You may contact 's clinic at [phone] with any questions or concerns.  If unable to reach your doctor, please go to the closest emergency room.    SPECIAL INSTRUCTIONS:  See Dr. Stinson's instructions on After Visit Summary.    Last dose of pain medication was given at:    Tylenol (1000mg) last at 9:15am. Do not exceed 4000mg of tylenol in a 24 hour period.  May take norco next at anytime.

## 2023-08-02 NOTE — OP NOTE
LUMBAR LAMINECTOMY DISCECTOMY MINIMALLY INVASIVE  Procedure Report    Patient Name:  Jim Verma  YOB: 1960    Date of Surgery:  8/2/2023     Indications: Lumbar 4-lumbar 5 synovial cyst with spinal stenosis    Pre-op Diagnosis:   Synovial cyst [M71.30]       Post-Op Diagnosis Codes:     * Synovial cyst [M71.30]    Procedure/CPTr Codes:      Procedure(s):  MINIMALLY INVASIVE LUMBAR LAMINECTOMY WITH SYNOVIAL CYST RESECTION, RIGHT APPROACH,  LUMBAR 4-LUMBAR 5    Staff:  Surgeon(s):  Valdez Stinson MD    Assistant: Chikis Kent RN CSA    Anesthesia: General    Estimated Blood Loss:  30 mL      Specimen:          A: Cyst capsule, none to pathology        Findings: Large synovial cyst somewhat adherent to the spinal sac    Complications: No apparent intraoperative complications    Description of Procedure: After informed consent was obtained, the patient was brought to the operating room.  After induction of adequate general endotracheal anesthesia the patient was placed in the prone position on the Milad table utilizing the Jerome frame.  All pressure points were padded.  The back was prepped and draped in the typical fashion.  The midline was marked and a line approximately 2-1/2 cm to the right of midline was drawn.  On this line the L4-5 interspace was localized using a spinal needle and the C arm.  A 2 cm incision was then made over the level.  The Boss tubular retractor system was used to dilate the tissue docking at L4-5.  The level was again confirmed with fluoroscopy.  The microscope was brought in and used for the remainder of the case for improved magnification and illumination.  The lamina was cleared of soft tissue and the Kerrison punches were used to remove the lamina above the insertion of the ligamentum flavum.  The cyst was apparent adjacent to the facet.  The cyst was dissected free from the spinal sac using the upgoing dissector.  The cyst was then undercut along with the  ligament until it was removed from its attachment of the medial facet.   After undercutting the medial facet and ligament, the ball probe passed freely along the right L5 nerve root.  The facet joint was seen to be quite hypertrophied.  After decompression was felt to be adequate, hemostasis was obtained using the bipolar electrocautery as well as Gelfoam.  The wound was irrigated with normal saline.  40 mg of Depo-Medrol was placed over the spinal sac and the L5 nerve root.    The tubular retractor was removed and hemostasis assured in the soft tissue.  The incision was reapproximated using interrupted 0 Vicryl in the fascia and a 2-0 Vicryl in the subcutaneous tissue.  The wound was dressed with Mastisol, Steri-Strips, Telfa and a Tegaderm.     Assistant: Chikis Kent RN CSA  was responsible for performing the following activities: Retraction, Suction, Irrigation, Closing, and Placing Dressing and their skilled assistance was necessary for the success of this case.    Valdez Stinson MD     Date: 8/2/2023  Time: 12:47 EDT

## 2023-08-03 ENCOUNTER — TELEPHONE (OUTPATIENT)
Dept: NEUROSURGERY | Facility: CLINIC | Age: 63
End: 2023-08-03
Payer: COMMERCIAL

## 2023-08-16 ENCOUNTER — TELEPHONE (OUTPATIENT)
Dept: ORTHOPEDIC SURGERY | Facility: CLINIC | Age: 63
End: 2023-08-16

## 2023-08-16 ENCOUNTER — HOSPITAL ENCOUNTER (OUTPATIENT)
Dept: GENERAL RADIOLOGY | Facility: HOSPITAL | Age: 63
Discharge: HOME | End: 2023-08-16
Admitting: PHYSICIAN ASSISTANT
Payer: COMMERCIAL

## 2023-08-16 ENCOUNTER — CLINICAL SUPPORT (OUTPATIENT)
Dept: ORTHOPEDIC SURGERY | Facility: CLINIC | Age: 63
End: 2023-08-16
Payer: COMMERCIAL

## 2023-08-16 VITALS — TEMPERATURE: 98.6 F | HEIGHT: 69 IN | WEIGHT: 190 LBS | BODY MASS INDEX: 28.14 KG/M2

## 2023-08-16 DIAGNOSIS — M17.11 PRIMARY OSTEOARTHRITIS OF RIGHT KNEE: ICD-10-CM

## 2023-08-16 DIAGNOSIS — M17.11 PRIMARY OSTEOARTHRITIS OF RIGHT KNEE: Primary | ICD-10-CM

## 2023-08-16 PROCEDURE — 73562 X-RAY EXAM OF KNEE 3: CPT

## 2023-08-16 RX ORDER — METHYLPREDNISOLONE ACETATE 80 MG/ML
160 INJECTION, SUSPENSION INTRA-ARTICULAR; INTRALESIONAL; INTRAMUSCULAR; SOFT TISSUE
Status: COMPLETED | OUTPATIENT
Start: 2023-08-16 | End: 2023-08-16

## 2023-08-16 RX ORDER — LIDOCAINE HYDROCHLORIDE 20 MG/ML
2 INJECTION, SOLUTION EPIDURAL; INFILTRATION; INTRACAUDAL; PERINEURAL
Status: COMPLETED | OUTPATIENT
Start: 2023-08-16 | End: 2023-08-16

## 2023-08-16 RX ADMIN — LIDOCAINE HYDROCHLORIDE 2 ML: 20 INJECTION, SOLUTION EPIDURAL; INFILTRATION; INTRACAUDAL; PERINEURAL at 13:10

## 2023-08-16 RX ADMIN — METHYLPREDNISOLONE ACETATE 160 MG: 80 INJECTION, SUSPENSION INTRA-ARTICULAR; INTRALESIONAL; INTRAMUSCULAR; SOFT TISSUE at 13:10

## 2023-08-16 NOTE — TELEPHONE ENCOUNTER
I called the patient to let him know his injection for today had not been approved, he states he has to have something done today he has had increased pain over the last month.     I have ordered new x-rays since the last x-ray was performed in Feb. 2022    Patient will arrive early for the x-ray and I sincerely apologized that the Monovisc injection approval is pending.     Patient voiced understanding

## 2023-08-16 NOTE — PROGRESS NOTES
"Chief Complaint  Follow-up and Pain of the Right Knee (STEROID INJECTION)    Subjective    History of Present Illness      Jim Verma is a 63 y.o. male who presents to [facility] for injection therapy of right knee.  He received first Monovisc injection in July 2022 and reports great relief of pain.  He had been receiving the Monovisc every 6 months, however his insurance has recently changed and it was denied.  Today we will try a steroid injection to get him through.      Objective   Vital Signs:   Temp 98.6 øF (37 øC)   Ht 175.3 cm (69\")   Wt 86.2 kg (190 lb)   BMI 28.06 kg/mý     Physical Exam  63 y.o. male is awake, alert, oriented, in no acute distress and well developed, well nourished.  Ortho Exam   RIGHT knee  There is mild joint line tenderness at the medial aspect of the knee.   Positive for varus orientation of the knee.   Positive for crepitus throughout range of motion.   Negative for effusion.  Positive patellar grind test.   Negative Lachman test.    Negative anterior and posterior drawer.  Range of motion in extension and flexion is: 0-115 degrees.  Neurovascular status is intact.    Dorsalis pedis and posterior tibial artery pulses are palpable.    Common peroneal nerve function is well preserved.  Gait is cautious.        Result Review :   RIGHT knee xrays  weightbearing/standing 3 views were ordered by Ha Burger PA-C. Performed at [facility] on 8/16/2023. Images were independently viewed and interpreted by myself, my impression as follows:   Findings: Moderate tricompartmental osteoarthritis with most prominent findings at the medial compartment where there is near bone-on-bone or possible bone-on-bone articulation  Bony lesion: no  Soft tissues: within normal limits  Joint spaces: decreased  Hardware appropriately positioned: not applicable  Prior studies available for comparison: no         PROCEDURE  Large Joint " Arthrocentesis: R knee  Date/Time: 8/16/2023 1:10 PM  Consent given by: patient  Site marked: site marked  Timeout: Immediately prior to procedure a time out was called to verify the correct patient, procedure, equipment, support staff and site/side marked as required   Supporting Documentation  Indications: pain   Procedure Details  Location: knee - R knee  Preparation: Patient was prepped and draped in the usual sterile fashion  Needle size: 25 G  Approach: anteromedial  Medications administered: 2 mL lidocaine PF 2% 2 %; 160 mg methylPREDNISolone acetate 80 MG/ML  Patient tolerance: patient tolerated the procedure well with no immediate complications           Injection site was identified by physical examination and cleaned with Betadine and alcohol swabs. Prior to needle insertion, ethyl chloride spray was used for surface anesthesia. Sterile technique was used.       Assessment   Assessment and Plan    Problem List Items Addressed This Visit          Musculoskeletal and Injuries    Primary osteoarthritis of right knee    Relevant Orders    Large Joint Arthrocentesis: R knee     Follow Up   Right knee Depo-Medrol injection was discussed with the patient. Discussed indication, risks, benefits, and alternatives. Verbal consent was given to proceed with the procedure.   Injection was performed from anteromedial approach.  Patient tolerated the procedure well and no complications were encountered.  Discussion of orthopedic goals and activities and patient/guardian expressed understanding.  Ice, heat, rest, compression and elevation of extremity as beneficial  nsaids and/or tylenol as beneficial  Instructed to refrain from heavy activity/rest the extremity for the next 24-48 hours  Discussion regarding possibility of cortisol flare and what to expect if this occurs  Watch for signs and symptoms of infection  Call if adverse effect from injection therapy  Follow up in 3 months for steroid.  I will check to see if we  are still trying to get approval for Monovisc.  Patient was given instructions and counseling regarding his condition or for health maintenance advice. Please see specific information pulled into the AVS if appropriate.     Ha Burger PA-C   Date of Encounter: 8/16/2023   Electronically signed by Ha Burger PA-C, 08/16/23, 1:22 PM EDT.        EMR Dragon/Transcription disclaimer:  Much of this encounter note is an electronic transcription/translation of spoken language to printed text. The electronic translation of spoken language may permit erroneous, or at times, nonsensical words or phrases to be inadvertently transcribed; Although I have reviewed the note for such errors, some may still exist.

## 2023-08-16 NOTE — TELEPHONE ENCOUNTER
Encounter Date:04/01/2021  3/18/2021    Patient ID: Tao Pozo is a 73 y.o. male.    Chief Complaint:  CABG  Hypertension  Dyslipidemia  Diabetes  Renal dysfunction    History of Present Illness  Patient recently had cardiac catheterization followed by CABG and was discharged home on 3/18/2021.    Since I have last seen, the patient has been without any chest discomfort ,shortness of breath, palpitations, dizziness or syncope.  Denies having any headache ,abdominal pain ,nausea, vomiting , diarrhea constipation, loss of weight or loss of appetite.  Denies having any excessive bruising ,hematuria or blood in the stool.    Review of all systems negative except as indicated.    Reviewed ROS.    Assessment and Plan       ]]]]]]]]]]]]]]]]]]]  Impression  ========  -Status post urgent CABG x 3 with a LIMA to the mid LAD and reverse individual saphenous vein graft to the distal RCA into the medial marginal-     -Preoperative chest discomfort-exertional burning sensation in the chest.  More with carrying.  Relieved by rest.  Suggestive of angina pectoris.     Cardiac catheterization 3/12/2021 revealed left ventricle size and contractility is normal with ejection fraction of 60%.  No mitral regurgitation is present.  Left subclavian artery and left internal mammary arteries are normal.     Left main coronary artery normal.  Left anterior descending artery has diffuse calcification with ostial 60% proximal 90 and mid segment 90% disease.  Circumflex coronary artery provided a large marginal branch.  Circumflex coronary artery has 60% disease proximal to the origin of marginal branch.  Circumflex coronary artery distal to the marginal branches small in caliber and has diffuse 60 to 70% disease.  Right coronary artery is a very large and dominant vessel that has ostial 99% disease and mid segment 90% disease.  There appears to be a clot.     Cardiac cath 12/13/2014 revealed 40 to 50% mid LAD ostial 60 to 70% RCA  PA FORM AND CLINICALS HAVE BEEN FAXED TO [employer] FOR APPROVAL   (FFR did not reveal any significant disease)  History of left heart cath     Echocardiogram-normal 2/23/2021  Lexiscan Cardiolite test 2/23/2021-abnormal with apical ischemia.     -Hypertension dyslipidemia diabetes.  CKD 3.  Recent BUN 35 creatinine 1.27-12/27/2020     -History of significant sinus bradycardia that has improved in the past with discontinuation of Bystolic.     -History of nonsustained ventricular tachycardia with exercise in the past.     -Status post tonsillectomy adenoidectomy     -Family history of coronary artery disease     -Non-smoker     -Allergic to shellfish and crab.  ==============  Plan  =========   status post CABG 3/15/2021  Patient is not having any angina pectoris or congestive heart failure.  Patient is on Plavix.  Likely will discontinue after 6 weeks and continue on baby aspirin.     Hypertension-stable 138/79  Continue metoprolol lisinopril    Rhythm-sinus rhythm     Recent positive COVID-19 nasal swab.  Thought to be noninfectious.  Infectious disease has seen prior to CABG.    Dyslipidemia-continue atorvastatin     Patient is on aspirin atorvastatin metoprolol 12.5 mg twice a day.    Patient to join cardiac rehabilitation phase 2.  Rehab treadmill next week.  Cardiac rehab orders placed.    Activities limitations and expectations were discussed with patient.     Further plan will depend on patient's progress  ]]]]]]]]]]]]]               Diagnosis Plan   1. S/P CABG (coronary artery bypass graft)  Treadmill Stress Test    Cardiac Rehab Phase II   2. Hx of CABG     3. Dyslipidemia     4. Essential hypertension     5. Diabetes mellitus due to underlying condition without complication, without long-term current use of insulin (CMS/[facility])     6. Chronic coronary artery disease     LAB RESULTS (LAST 7 DAYS)    CBC        BMP        CMP         BNP        TROPONIN        CoAg        Creatinine Clearance  Estimated Creatinine Clearance: 74.5 mL/min (by C-G formula based on SCr of 0.96  mg/dL).    ABG        Radiology  No radiology results for the last day                The following portions of the patient's history were reviewed and updated as appropriate: allergies, current medications, past family history, past medical history, past social history, past surgical history and problem list.    Review of Systems   Constitutional: Negative for malaise/fatigue.   Cardiovascular: Negative for chest pain, leg swelling, palpitations and syncope.   Respiratory: Negative for shortness of breath.    Skin: Negative for rash.   Gastrointestinal: Negative for nausea and vomiting.   Neurological: Negative for dizziness, light-headedness and numbness.         Current Outpatient Medications:   •  aspirin 81 MG EC tablet, Take 1 tablet by mouth Daily., Disp: 30 tablet, Rfl: 3  •  atorvastatin (LIPITOR) 40 MG tablet, Take 1 tablet by mouth Every Night., Disp: 30 tablet, Rfl: 3  •  CBD (cannabidiol) oral oil, Take 1 mL by mouth Daily. For arthritis, Disp: , Rfl:   •  clopidogrel (PLAVIX) 75 MG tablet, Take 1 tablet by mouth Daily., Disp: 30 tablet, Rfl: 3  •  glimepiride (AMARYL) 1 MG tablet, Take 2 mg by mouth Daily., Disp: , Rfl:   •  metFORMIN (GLUCOPHAGE) 1000 MG tablet, Take 1,000 mg by mouth 2 (Two) Times a Day With Meals., Disp: , Rfl:   •  metoprolol tartrate (LOPRESSOR) 25 MG tablet, Take 1 tablet by mouth Every 12 (Twelve) Hours., Disp: 60 tablet, Rfl: 3  •  SITagliptin (JANUVIA) 100 MG tablet, Take 100 mg by mouth Daily., Disp: , Rfl:   •  atorvastatin (LIPITOR) 20 MG tablet, , Disp: , Rfl:   •  furosemide (LASIX) 40 MG tablet, Take 1 tablet by mouth Daily., Disp: 7 tablet, Rfl: 0  •  lisinopril (PRINIVIL,ZESTRIL) 40 MG tablet, , Disp: , Rfl:   •  potassium chloride (K-DUR,KLOR-CON) 20 MEQ CR tablet, Take 1 tablet by mouth Daily., Disp: 7 tablet, Rfl: 0  •  Triamcinolone Acetonide (NASACORT) 55 MCG/ACT nasal inhaler, 2 sprays into the nostril(s) as directed by provider Daily., Disp: , Rfl:     Allergies    Allergen Reactions   • Shellfish-Derived Products Anaphylaxis       Family History   Problem Relation Age of Onset   • Diabetes Mother    • Heart disease Father    • Diabetes Sister    • Diabetes Brother    • Hypertension Brother    • Heart disease Brother        Past Surgical History:   Procedure Laterality Date   • CARDIAC CATHETERIZATION N/A 3/12/2021    Procedure: Left Heart Cath with Coronary Angiography;  Surgeon: Huseyin Do MD;  Location: [facility] CATH INVASIVE LOCATION;  Service: Cardiovascular;  Laterality: N/A;   • CORONARY ARTERY BYPASS GRAFT N/A 3/15/2021    Procedure: CORONARY ARTERY BYPASS GRAFTING;  Surgeon: Paxton Vasquez MD;  Location: [facility] CVOR;  Service: Cardiothoracic;  Laterality: N/A;   • TONSILECTOMY, ADENOIDECTOMY, BILATERAL MYRINGOTOMY AND TUBES  1953       Past Medical History:   Diagnosis Date   • Arthritis    • Coronary artery disease 2/14/2012   • Diabetes mellitus (CMS/[facility])    • Elevated cholesterol    • Hyperlipidemia    • Hypertension        Family History   Problem Relation Age of Onset   • Diabetes Mother    • Heart disease Father    • Diabetes Sister    • Diabetes Brother    • Hypertension Brother    • Heart disease Brother        Social History     Socioeconomic History   • Marital status:      Spouse name: Not on file   • Number of children: Not on file   • Years of education: Not on file   • Highest education level: Not on file   Tobacco Use   • Smoking status: Never Smoker   • Smokeless tobacco: Never Used   Vaping Use   • Vaping Use: Never used   Substance and Sexual Activity   • Alcohol use: No   • Drug use: Defer   • Sexual activity: Defer           ECG 12 Lead    Date/Time: 4/1/2021 2:34 PM  Performed by: Huseyin Do MD  Authorized by: Huseyin Do MD   Comparison: compared with previous ECG   Similar to previous ECG  Comparison to previous ECG: Sinus bradycardia nonspecific ST-T wave changes baseline artifact 55/min normal axis normal  "intervals no significant change from 3/17/2021                Objective:       Physical Exam    /79   Pulse 51   Temp 96.8 °F (36 °C)   Ht 180.3 cm (71\")   Wt 78 kg (172 lb)   SpO2 97%   BMI 23.99 kg/m²   The patient is alert, oriented and in no distress.    Vital signs as noted above.    Head and neck revealed no carotid bruits or jugular venous distension.  No thyromegaly or lymphadenopathy is present.    Lungs clear.  No wheezing.  Breath sounds are normal bilaterally.    Heart normal first and second heart sounds.  No murmur..  No pericardial rub is present.  No gallop is present.    Abdomen soft and nontender.  No organomegaly is present.    Extremities revealed good peripheral pulses without any pedal edema.    Skin warm and dry.  Incisions are looking well.    Musculoskeletal system is grossly normal.    CNS grossly normal.    Reviewed and updated.        "

## 2023-08-22 ENCOUNTER — OFFICE VISIT (OUTPATIENT)
Dept: NEUROSURGERY | Facility: CLINIC | Age: 63
End: 2023-08-22
Payer: COMMERCIAL

## 2023-08-22 VITALS
SYSTOLIC BLOOD PRESSURE: 127 MMHG | WEIGHT: 191 LBS | BODY MASS INDEX: 28.29 KG/M2 | HEIGHT: 69 IN | DIASTOLIC BLOOD PRESSURE: 76 MMHG | HEART RATE: 82 BPM

## 2023-08-22 DIAGNOSIS — M71.30 SYNOVIAL CYST: ICD-10-CM

## 2023-08-22 DIAGNOSIS — Z98.890 S/P LAMINECTOMY: Primary | ICD-10-CM

## 2023-08-22 PROCEDURE — 99024 POSTOP FOLLOW-UP VISIT: CPT | Performed by: NURSE PRACTITIONER

## 2023-08-22 PROCEDURE — 1160F RVW MEDS BY RX/DR IN RCRD: CPT | Performed by: NURSE PRACTITIONER

## 2023-08-22 PROCEDURE — 1159F MED LIST DOCD IN RCRD: CPT | Performed by: NURSE PRACTITIONER

## 2023-08-22 NOTE — PROGRESS NOTES
"Chief Complaint  Post-op    Subjective          Jim Verma who is a 63 y.o. year old male who presents to [facility] 3 weeks s/p left MIL with synovial cyst resection L4/5.     History of Present Illness  Pt has appreciated improvement in extremity pain. He has some back and incisional pain. Rates pain 2-3/10 today.     Incision is healing well. No warmth, erythema, or edema. No fever or chills.             Review of Systems   Musculoskeletal:  Positive for back pain.   Neurological:  Positive for numbness.   All other systems reviewed and are negative.     Objective   Vital Signs:   /76 (BP Location: Left arm, Patient Position: Sitting, Cuff Size: Adult)   Pulse 82   Ht 175.3 cm (69\")   Wt 86.6 kg (191 lb)   BMI 28.21 kg/mý       Physical Exam  Vitals reviewed.   Constitutional:       Appearance: Normal appearance.   Skin:         Neurological:      Mental Status: He is alert and oriented to person, place, and time.      Motor: Motor strength is normal.      Gait: Gait is intact.      Neurologic Exam     Mental Status   Oriented to person, place, and time.   Level of consciousness: alert    Motor Exam   Muscle bulk: normal  Overall muscle tone: normal    Strength   Strength 5/5 throughout.     Gait, Coordination, and Reflexes     Gait  Gait: normal     Result Review :                 Assessment and Plan    Diagnoses and all orders for this visit:    1. S/P laminectomy (Primary)    2. Synovial cyst    Pt is doing well. We discussed mobility restrictions x3 more weeks. Slowly increase activity as tolerated. Follow up as needed.       Follow Up   Return if symptoms worsen or fail to improve.  Patient was given instructions and counseling regarding his condition or for health maintenance advice.       -No lifting greater than 10 pounds, limit bending and twisting at the waist, avoid strenuous or jarring activities x3 more weeks  -Follow up as needed    Answers " submitted by the patient for this visit:  Primary Reason for Visit (Submitted on 8/15/2023)  What is the primary reason for your visit?: Back Pain

## 2023-08-22 NOTE — PATIENT INSTRUCTIONS
-No lifting greater than 10 pounds, limit bending and twisting at the waist, avoid strenuous or jarring activities x3 more weeks  -Follow up as needed

## 2023-08-30 ENCOUNTER — OFFICE VISIT (OUTPATIENT)
Dept: UROLOGY | Facility: CLINIC | Age: 63
End: 2023-08-30
Payer: COMMERCIAL

## 2023-08-30 VITALS — WEIGHT: 193 LBS | BODY MASS INDEX: 28.58 KG/M2 | RESPIRATION RATE: 16 BRPM | HEIGHT: 69 IN

## 2023-08-30 DIAGNOSIS — R32 URINARY INCONTINENCE, UNSPECIFIED TYPE: Primary | ICD-10-CM

## 2023-08-30 PROBLEM — I10 HYPERTENSION: Status: ACTIVE | Noted: 2021-12-21

## 2023-08-30 LAB
BILIRUB BLD-MCNC: NEGATIVE MG/DL
CLARITY, POC: CLEAR
COLOR UR: YELLOW
EXPIRATION DATE: NORMAL
GLUCOSE UR STRIP-MCNC: NEGATIVE MG/DL
KETONES UR QL: NEGATIVE
LEUKOCYTE EST, POC: NEGATIVE
Lab: NORMAL
NITRITE UR-MCNC: NEGATIVE MG/ML
PH UR: 6 [PH] (ref 5–8)
PROT UR STRIP-MCNC: NEGATIVE MG/DL
RBC # UR STRIP: NEGATIVE /UL
SP GR UR: 1.01 (ref 1–1.03)
URINE VOLUME: 26
UROBILINOGEN UR QL: NORMAL

## 2023-08-30 NOTE — PROGRESS NOTES
Chief Complaint: Urinary Incontinence    Subjective         History of Present Illness  Jim Verma is a 63 y.o. male presents to [facility] to be seen for f/u  urinary incontinence.    At last visit we started him on finasteride with his terazosin for control of his urinary symptoms.    He states nocturia x 2    He has had no further nocturnal enuresis.    He is still with occasional leakage during the day.     He states urgency and frequency has improved as well.    Previous:  He states that he has had an increasingly difficult time with urinary urgency frequency and urge incontinence over the last 6 months.    He has had a few episodes of nocturnal enuresis.     Nocturia x 2-3     He has been on terazosin for several years for BPH.    He states his stream is good as long as he takes his hytrin.     He denies straining to void as long as he takes terazosin.    He drinks milk water tea daily and beer at times.     He voids every 2-3 hours.     No family hx of gu malignancies.     He is a previus smoker quit over 20 years ago.      Objective     Past Medical History:   Diagnosis Date    Alcohol use     PT STATES THAT HE DRINKS 4 BEERS DAILY    Anxiety and depression     Arthritis     NECK    GERD (gastroesophageal reflux disease)     History of precordial chest pain     EVALUATED BY /ALSO A COUPLE ER VISITS-PT STATES R/T HTN AND ANXIETY, NO RECENT  CP OR  SOA    Hyperlipidemia     Hypertension     Low back pain     Synovial cyst        Past Surgical History:   Procedure Laterality Date    KIDNEY STONE SURGERY      LUMBAR LAMINECTOMY Right 8/2/2023    Procedure: MINIMALLY INVASIVE LUMBAR LAMINECTOMY WITH SYNOVIAL CYST RESECTION, RIGHT APPROACH,  LUMBAR 4-LUMBAR 5;  Surgeon: Valdez Stinson MD;  Location: [facility];  Service: Neurosurgery;  Laterality: Right;    SHOULDER SURGERY Right          Current Outpatient Medications:     amLODIPine (NORVASC) 10 MG tablet, Take 1  "tablet by mouth Daily., Disp: , Rfl:     clonazePAM (KlonoPIN) 0.5 MG tablet, TAKE 1 TABLET BY MOUTH TWICE DAILY AS NEEDED FOR PANIC OR ANXIETY, Disp: , Rfl:     cyclobenzaprine (FLEXERIL) 10 MG tablet, Take 1 tablet by mouth Every Night., Disp: , Rfl:     Desvenlafaxine Succinate ER 25 MG tablet sustained-release 24 hour, Take 1 tablet by mouth Daily As Needed., Disp: , Rfl:     diphenhydrAMINE (BENADRYL) 25 mg capsule, Take 1 capsule by mouth Every 6 (Six) Hours As Needed for Itching., Disp: , Rfl:     finasteride (PROSCAR) 5 MG tablet, Take 1 tablet by mouth Daily., Disp: 90 tablet, Rfl: 3    hydroCHLOROthiazide (HYDRODIURIL) 12.5 MG tablet, Take 1 tablet by mouth Daily As Needed., Disp: , Rfl:     nebivolol (BYSTOLIC) 2.5 MG tablet, Take 1 tablet by mouth Daily As Needed., Disp: , Rfl:     Omeprazole 20 MG tablet delayed-release, Take 20 mg by mouth Daily., Disp: , Rfl:     rosuvastatin (CRESTOR) 10 MG tablet, Take 1 tablet by mouth Every Night., Disp: , Rfl:     terazosin (HYTRIN) 5 MG capsule, Take 2 capsules by mouth Every Night., Disp: 180 capsule, Rfl: 3    Allergies   Allergen Reactions    Ace Inhibitors Unknown - High Severity    Metoprolol Unknown - High Severity    Valsartan Unknown - High Severity        Family History   Family history unknown: Yes       Social History     Socioeconomic History    Marital status: Single   Tobacco Use    Smoking status: Never     Passive exposure: Never    Smokeless tobacco: Never   Vaping Use    Vaping Use: Never used   Substance and Sexual Activity    Alcohol use: Not Currently     Comment: 4-5 BEER DAILY    Drug use: Never    Sexual activity: Defer       Vital Signs:   Resp 16   Ht 175.3 cm (69\")   Wt 87.5 kg (193 lb)   BMI 28.50 kg/mý      Physical Exam     Result Review :   The following data was reviewed by: MAURA Oneil on 8/30/2023:  Results for orders placed or performed in visit on 08/30/23   Bladder Scan   Result Value Ref Range    Urine Volume " 26    POC Urinalysis Dipstick, Automated    Specimen: Urine   Result Value Ref Range    Color Yellow Yellow, Straw, Dark Yellow, Hodan    Clarity, UA Clear Clear    Specific Gravity  1.010 1.005 - 1.030    pH, Urine 6.0 5.0 - 8.0    Leukocytes Negative Negative    Nitrite, UA Negative Negative    Protein, POC Negative Negative mg/dL    Glucose, UA Negative Negative mg/dL    Ketones, UA Negative Negative    Urobilinogen, UA 0.2 E.U./dL Normal, 0.2 E.U./dL    Bilirubin Negative Negative    Blood, UA Negative Negative    Lot Number [id number]     Expiration Date 8/2,024          Bladder Scan interpretation 08/30/2023    Estimation of residual urine via NoRedInkI 3000 Verathon Bladder Scan  MA/nurse performing: Vickie soto Ma   Residual Urine: 26 ml  Indication: Urinary incontinence, unspecified type   Position: Supine  Examination: Incremental scanning of the suprapubic area using 2.0 MHz transducer using copious amounts of acoustic gel.   Findings: An anechoic area was demonstrated which represented the bladder, with measurement of residual urine as noted. I inspected this myself. In that the residual urine was stable or insignificant, refer to plan for treatment and plan necessary at this time.            Procedures        Assessment and Plan    Diagnoses and all orders for this visit:    1. Urinary incontinence, unspecified type (Primary)  -     POC Urinalysis Dipstick, Automated  -     Bladder Scan      We will continue finasteride and terazosin.           I spent 10  minutes caring for Jim on this date of service. This time includes time spent by me in the following activities:reviewing tests, obtaining and/or reviewing a separately obtained history, performing a medically appropriate examination and/or evaluation , counseling and educating the patient/family/caregiver, ordering medications, tests, or procedures, and documenting information in the medical record  Follow Up   Return in about 6 months (around 2/29/2024) for  f/u bph with pvr.  Patient was given instructions and counseling regarding his condition or for health maintenance advice. Please see specific information pulled into the AVS if appropriate.         This document has been electronically signed by MAURA Oneil  August 30, 2023 13:48 EDT

## 2023-09-14 ENCOUNTER — TELEPHONE (OUTPATIENT)
Dept: NEUROLOGY | Facility: CLINIC | Age: 63
End: 2023-09-14

## 2023-09-14 NOTE — TELEPHONE ENCOUNTER
Patient called answering service 9/13 states he has surgery a few weeks ago and is now constipated. Please advise

## 2023-09-14 NOTE — TELEPHONE ENCOUNTER
Can you check on this. Is he still taking pain medication? Has he tried any stool softeners or laxatives?

## 2023-09-14 NOTE — TELEPHONE ENCOUNTER
He is 6 weeks out from surgery. Would not suspect this to correlate with his surgery. I would agree with starting Mag Citrate. If symptoms persist he should touch base with PCP.

## 2023-09-26 ENCOUNTER — TELEPHONE (OUTPATIENT)
Dept: NEUROSURGERY | Facility: CLINIC | Age: 63
End: 2023-09-26
Payer: COMMERCIAL

## 2023-09-26 NOTE — TELEPHONE ENCOUNTER
Patient had MIL with synovial cyst resection on 8-2-23. Was doing ok, called today with complaints of right greater than left hip pain and right greater than left foot numbness. States he also has numbness in the little toe of the right foot.

## 2023-11-21 ENCOUNTER — CLINICAL SUPPORT (OUTPATIENT)
Dept: ORTHOPEDIC SURGERY | Facility: CLINIC | Age: 63
End: 2023-11-21
Payer: COMMERCIAL

## 2023-11-21 VITALS — TEMPERATURE: 97.3 F | WEIGHT: 196 LBS | BODY MASS INDEX: 29.03 KG/M2 | HEIGHT: 69 IN

## 2023-11-21 DIAGNOSIS — M17.11 PRIMARY OSTEOARTHRITIS OF RIGHT KNEE: Primary | ICD-10-CM

## 2023-11-21 RX ORDER — METHYLPREDNISOLONE ACETATE 80 MG/ML
160 INJECTION, SUSPENSION INTRA-ARTICULAR; INTRALESIONAL; INTRAMUSCULAR; SOFT TISSUE
Status: COMPLETED | OUTPATIENT
Start: 2023-11-21 | End: 2023-11-21

## 2023-11-21 RX ORDER — LIDOCAINE HYDROCHLORIDE 20 MG/ML
2 INJECTION, SOLUTION EPIDURAL; INFILTRATION; INTRACAUDAL; PERINEURAL
Status: COMPLETED | OUTPATIENT
Start: 2023-11-21 | End: 2023-11-21

## 2023-11-21 RX ADMIN — LIDOCAINE HYDROCHLORIDE 2 ML: 20 INJECTION, SOLUTION EPIDURAL; INFILTRATION; INTRACAUDAL; PERINEURAL at 12:52

## 2023-11-21 RX ADMIN — METHYLPREDNISOLONE ACETATE 160 MG: 80 INJECTION, SUSPENSION INTRA-ARTICULAR; INTRALESIONAL; INTRAMUSCULAR; SOFT TISSUE at 12:52

## 2023-11-21 NOTE — PROGRESS NOTES
"Chief Complaint  Follow-up and Pain of the Right Knee    Subjective    History of Present Illness      Jim Verma is a 63 y.o. male who presents to [facility] for injection therapy of right knee.  He received first Monovisc injection in July 2022 and reports great relief of pain.  He had been receiving the Monovisc every 6 months, however his insurance has recently changed and it was denied. At last visit, 3 months ago, I administered a steroid injection and he reports mild-moderate relief. He will be changing to Medicare at the beginning of 2024 and wants to plan on getting the visco injection again.      Objective   Vital Signs:   Temp 97.3 °F (36.3 °C)   Ht 175.3 cm (69\")   Wt 88.9 kg (196 lb)   BMI 28.94 kg/m²     Physical Exam  63 y.o. male is awake, alert, oriented, in no acute distress and well developed, well nourished.  Ortho Exam   RIGHT knee  There is mild joint line tenderness at the medial aspect of the knee.   Positive for varus orientation of the knee.   Positive for crepitus throughout range of motion.   Negative for effusion.  Positive patellar grind test.   Negative Lachman test.    Negative anterior and posterior drawer.  Range of motion in extension and flexion is: 0-115 degrees.  Neurovascular status is intact.    Dorsalis pedis and posterior tibial artery pulses are palpable.    Common peroneal nerve function is well preserved.  Gait is cautious.          PROCEDURE  Large Joint Arthrocentesis: R knee  Date/Time: 11/21/2023 12:52 PM  Consent given by: patient  Site marked: site marked  Timeout: Immediately prior to procedure a time out was called to verify the correct patient, procedure, equipment, support staff and site/side marked as required   Supporting Documentation  Indications: pain   Procedure Details  Location: knee - R knee  Preparation: Patient was prepped and draped in the usual sterile fashion  Needle size: 25 G  Approach: " anteromedial  Medications administered: 2 mL lidocaine PF 2% 2 %; 160 mg methylPREDNISolone acetate 80 MG/ML  Patient tolerance: patient tolerated the procedure well with no immediate complications        Injection site was identified by physical examination and cleaned with Betadine and alcohol swabs. Prior to needle insertion, ethyl chloride spray was used for surface anesthesia. Sterile technique was used.       Assessment   Assessment and Plan    Problem List Items Addressed This Visit          Musculoskeletal and Injuries    Primary osteoarthritis of right knee - Primary    Relevant Orders    Large Joint Arthrocentesis: R knee       Follow Up   Right knee Depo-Medrol injection was discussed with the patient. Discussed indication, risks, benefits, and alternatives. Verbal consent was given to proceed with the procedure.   Injection was performed from anteromedial approach.  Patient tolerated the procedure well and no complications were encountered.  Discussion of orthopedic goals and activities and patient/guardian expressed understanding.  Ice, heat, rest, compression and elevation of extremity as beneficial  nsaids and/or tylenol as beneficial  Instructed to refrain from heavy activity/rest the extremity for the next 24-48 hours  Discussion regarding possibility of cortisol flare and what to expect if this occurs  Watch for signs and symptoms of infection  Call if adverse effect from injection therapy  Follow up in 3 months for steroid.   Patient was given instructions and counseling regarding his condition or for health maintenance advice. Please see specific information pulled into the AVS if appropriate.     Ha Burger PA-C   Date of Encounter: 11/21/2023   Electronically signed by Ha Burger PA-C, 11/21/23, 1:01 PM EST.         EMR Dragon/Transcription disclaimer:  Much of this encounter note is an electronic transcription/translation of spoken language to printed text. The electronic  translation of spoken language may permit erroneous, or at times, nonsensical words or phrases to be inadvertently transcribed; Although I have reviewed the note for such errors, some may still exist.

## 2024-01-08 DIAGNOSIS — R32 URINARY INCONTINENCE, UNSPECIFIED TYPE: ICD-10-CM

## 2024-01-08 RX ORDER — TERAZOSIN 5 MG/1
10 CAPSULE ORAL NIGHTLY
Qty: 180 CAPSULE | Refills: 3 | Status: SHIPPED | OUTPATIENT
Start: 2024-01-08

## 2024-04-12 DIAGNOSIS — N40.1 BENIGN PROSTATIC HYPERPLASIA WITH URINARY FREQUENCY: ICD-10-CM

## 2024-04-12 DIAGNOSIS — R35.0 BENIGN PROSTATIC HYPERPLASIA WITH URINARY FREQUENCY: ICD-10-CM

## 2024-04-15 RX ORDER — FINASTERIDE 5 MG/1
5 TABLET, FILM COATED ORAL DAILY
Qty: 90 TABLET | Refills: 3 | Status: SHIPPED | OUTPATIENT
Start: 2024-04-15

## 2024-04-22 ENCOUNTER — TELEPHONE (OUTPATIENT)
Dept: UROLOGY | Facility: CLINIC | Age: 64
End: 2024-04-22

## 2024-04-29 ENCOUNTER — OFFICE VISIT (OUTPATIENT)
Dept: UROLOGY | Facility: CLINIC | Age: 64
End: 2024-04-29
Payer: MEDICARE

## 2024-04-29 VITALS
BODY MASS INDEX: 31.34 KG/M2 | OXYGEN SATURATION: 96 % | DIASTOLIC BLOOD PRESSURE: 68 MMHG | TEMPERATURE: 98.3 F | SYSTOLIC BLOOD PRESSURE: 133 MMHG | HEART RATE: 65 BPM | WEIGHT: 211.6 LBS | HEIGHT: 69 IN

## 2024-04-29 DIAGNOSIS — R32 URINARY INCONTINENCE, UNSPECIFIED TYPE: ICD-10-CM

## 2024-04-29 DIAGNOSIS — N40.1 BENIGN PROSTATIC HYPERPLASIA WITH URINARY FREQUENCY: Primary | ICD-10-CM

## 2024-04-29 DIAGNOSIS — R35.0 BENIGN PROSTATIC HYPERPLASIA WITH URINARY FREQUENCY: Primary | ICD-10-CM

## 2024-04-29 PROBLEM — K21.9 GASTROESOPHAGEAL REFLUX DISEASE: Status: ACTIVE | Noted: 2021-10-12

## 2024-04-29 PROBLEM — M25.569 KNEE PAIN: Status: ACTIVE | Noted: 2022-01-02

## 2024-04-29 PROBLEM — Z86.010 HISTORY OF COLONIC POLYPS: Status: ACTIVE | Noted: 2021-10-12

## 2024-04-29 PROBLEM — E61.1 IRON DEFICIENCY: Status: ACTIVE | Noted: 2021-07-31

## 2024-04-29 PROBLEM — M54.6 THORACIC BACK PAIN: Status: ACTIVE | Noted: 2021-04-08

## 2024-04-29 PROBLEM — R53.83 FATIGUE: Status: ACTIVE | Noted: 2021-12-16

## 2024-04-29 PROBLEM — Z86.0100 HISTORY OF COLONIC POLYPS: Status: ACTIVE | Noted: 2021-10-12

## 2024-04-29 PROBLEM — G47.9 DIFFICULTY SLEEPING: Status: ACTIVE | Noted: 2021-05-23

## 2024-04-29 PROBLEM — Z96.611 S/P REVERSE TOTAL SHOULDER ARTHROPLASTY, RIGHT: Status: RESOLVED | Noted: 2021-10-04 | Resolved: 2024-04-29

## 2024-04-29 PROBLEM — R10.9 RIGHT FLANK PAIN: Status: ACTIVE | Noted: 2021-04-08

## 2024-04-29 LAB
BILIRUB BLD-MCNC: NEGATIVE MG/DL
CLARITY, POC: CLEAR
COLOR UR: YELLOW
EXPIRATION DATE: NORMAL
GLUCOSE UR STRIP-MCNC: NEGATIVE MG/DL
KETONES UR QL: NEGATIVE
LEUKOCYTE EST, POC: NEGATIVE
Lab: NORMAL
NITRITE UR-MCNC: NEGATIVE MG/ML
PH UR: 6 [PH] (ref 5–8)
PROT UR STRIP-MCNC: NEGATIVE MG/DL
RBC # UR STRIP: NEGATIVE /UL
SP GR UR: 1.02 (ref 1–1.03)
URINE VOLUME: 31
UROBILINOGEN UR QL: NORMAL

## 2024-04-29 PROCEDURE — 3075F SYST BP GE 130 - 139MM HG: CPT | Performed by: NURSE PRACTITIONER

## 2024-04-29 PROCEDURE — 3078F DIAST BP <80 MM HG: CPT | Performed by: NURSE PRACTITIONER

## 2024-04-29 PROCEDURE — 51798 US URINE CAPACITY MEASURE: CPT | Performed by: NURSE PRACTITIONER

## 2024-04-29 PROCEDURE — 1159F MED LIST DOCD IN RCRD: CPT | Performed by: NURSE PRACTITIONER

## 2024-04-29 PROCEDURE — 99214 OFFICE O/P EST MOD 30 MIN: CPT | Performed by: NURSE PRACTITIONER

## 2024-04-29 PROCEDURE — 81003 URINALYSIS AUTO W/O SCOPE: CPT | Performed by: NURSE PRACTITIONER

## 2024-04-29 PROCEDURE — 1160F RVW MEDS BY RX/DR IN RCRD: CPT | Performed by: NURSE PRACTITIONER

## 2024-04-29 RX ORDER — GABAPENTIN 100 MG/1
1 CAPSULE ORAL 3 TIMES DAILY
COMMUNITY
Start: 2023-09-28

## 2024-04-29 RX ORDER — EPINEPHRINE 0.3 MG/.3ML
INJECTION SUBCUTANEOUS
COMMUNITY
Start: 2024-01-18

## 2024-04-29 RX ORDER — MONTELUKAST SODIUM 10 MG/1
TABLET ORAL
COMMUNITY
Start: 2024-02-18

## 2024-04-29 RX ORDER — MIRTAZAPINE 15 MG/1
TABLET, FILM COATED ORAL
COMMUNITY
Start: 2024-04-15

## 2024-04-29 RX ORDER — TERAZOSIN 5 MG/1
15 CAPSULE ORAL NIGHTLY
Qty: 270 CAPSULE | Refills: 3 | Status: SHIPPED | OUTPATIENT
Start: 2024-04-29

## 2024-04-29 NOTE — PROGRESS NOTES
Chief Complaint: Follow-up, Urinary Incontinence, and Benign Prostatic Hypertrophy    Subjective         Benign Prostatic Hypertrophy      Jim Verma is a 64 y.o. male presents to [facility] to be seen for f/u  urinary incontinence.    At last visit we continued his finasteride with his terazosin.    He states nocturia x 2 -3    He is having occasional nocturnal enuresis less than before.    Had testing for sleep apnea a long time ago does not wear c pap.    He states daytime urgency and frequency has improved as well. No further daytime leakage.     Previous:  He states that he has had an increasingly difficult time with urinary urgency frequency and urge incontinence over the last 6 months.    He has had a few episodes of nocturnal enuresis.     Nocturia x 2-3     He has been on terazosin for several years for BPH.    He states his stream is good as long as he takes his hytrin.     He denies straining to void as long as he takes terazosin.    He drinks milk water tea daily and beer at times.     He voids every 2-3 hours.     No family hx of gu malignancies.     He is a previus smoker quit over 20 years ago.      Objective     Past Medical History:   Diagnosis Date    Alcohol use     PT STATES THAT HE DRINKS 4 BEERS DAILY    Anxiety and depression     Arthritis     NECK    GERD (gastroesophageal reflux disease)     History of precordial chest pain     EVALUATED BY /ALSO A COUPLE ER VISITS-PT STATES R/T HTN AND ANXIETY, NO RECENT  CP OR  SOA    Hyperlipidemia     Hypertension     Low back pain     Synovial cyst        Past Surgical History:   Procedure Laterality Date    KIDNEY STONE SURGERY      LUMBAR LAMINECTOMY Right 8/2/2023    Procedure: MINIMALLY INVASIVE LUMBAR LAMINECTOMY WITH SYNOVIAL CYST RESECTION, RIGHT APPROACH,  LUMBAR 4-LUMBAR 5;  Surgeon: Valdez Stinson MD;  Location: [facility] OR;  Service: Neurosurgery;  Laterality: Right;    SHOULDER SURGERY Right           Current Outpatient Medications:     amLODIPine (NORVASC) 10 MG tablet, Take 1 tablet by mouth Daily., Disp: , Rfl:     clonazePAM (KlonoPIN) 0.5 MG tablet, TAKE 1 TABLET BY MOUTH TWICE DAILY AS NEEDED FOR PANIC OR ANXIETY, Disp: , Rfl:     cyclobenzaprine (FLEXERIL) 10 MG tablet, Take 1 tablet by mouth Every Night., Disp: , Rfl:     Desvenlafaxine Succinate ER 25 MG tablet sustained-release 24 hour, Take 1 tablet by mouth Daily As Needed., Disp: , Rfl:     diphenhydrAMINE (BENADRYL) 25 mg capsule, Take 1 capsule by mouth Every 6 (Six) Hours As Needed for Itching., Disp: , Rfl:     EPINEPHrine (EPIPEN) 0.3 MG/0.3ML solution auto-injector injection, SMARTSI Pre-Filled Pen Syringe IM Once, Disp: , Rfl:     finasteride (PROSCAR) 5 MG tablet, TAKE 1 TABLET BY MOUTH DAILY, Disp: 90 tablet, Rfl: 3    gabapentin (NEURONTIN) 100 MG capsule, Take 1 capsule by mouth 3 (Three) Times a Day., Disp: , Rfl:     hydroCHLOROthiazide (HYDRODIURIL) 12.5 MG tablet, Take 1 tablet by mouth Daily As Needed., Disp: , Rfl:     mirtazapine (REMERON) 15 MG tablet, TAKE 1 TABLET(15 MG) BY MOUTH EVERY NIGHT, Disp: , Rfl:     montelukast (SINGULAIR) 10 MG tablet, , Disp: , Rfl:     nebivolol (BYSTOLIC) 2.5 MG tablet, Take 1 tablet by mouth Daily As Needed., Disp: , Rfl:     Omeprazole 20 MG tablet delayed-release, Take 20 mg by mouth Daily., Disp: , Rfl:     rosuvastatin (CRESTOR) 10 MG tablet, Take 1 tablet by mouth Every Night., Disp: , Rfl:     terazosin (HYTRIN) 5 MG capsule, Take 3 capsules by mouth Every Night., Disp: 270 capsule, Rfl: 3    Allergies   Allergen Reactions    Ace Inhibitors Unknown - High Severity    Metoprolol Unknown - High Severity    Valsartan Unknown - High Severity        Family History   Family history unknown: Yes       Social History     Socioeconomic History    Marital status: Single   Tobacco Use    Smoking status: Never     Passive exposure: Never    Smokeless tobacco: Never   Vaping Use    Vaping  "status: Never Used   Substance and Sexual Activity    Alcohol use: Not Currently     Comment: 4-5 BEER DAILY    Drug use: Never    Sexual activity: Defer       Vital Signs:   /68 (BP Location: Left arm, Patient Position: Sitting, Cuff Size: Large Adult)   Pulse 65   Temp 98.3 °F (36.8 °C) (Oral)   Ht 175.3 cm (69.02\")   Wt 96 kg (211 lb 9.6 oz)   SpO2 96%   BMI 31.23 kg/m²      Physical Exam     Result Review :   The following data was reviewed by: MAURA Oneil on 4/29/2024:  Results for orders placed or performed in visit on 04/29/24   Bladder Scan   Result Value Ref Range    Urine Volume 31    POC Urinalysis Dipstick, Automated    Specimen: Urine   Result Value Ref Range    Color Yellow Yellow, Straw, Dark Yellow, Hodan    Clarity, UA Clear Clear    Specific Gravity  1.020 1.005 - 1.030    pH, Urine 6.0 5.0 - 8.0    Leukocytes Negative Negative    Nitrite, UA Negative Negative    Protein, POC Negative Negative mg/dL    Glucose, UA Negative Negative mg/dL    Ketones, UA Negative Negative    Urobilinogen, UA Normal Normal, 0.2 E.U./dL    Bilirubin Negative Negative    Blood, UA Negative Negative    Lot Number [id number]     Expiration Date 2,025/2          Bladder Scan interpretation 04/29/2024    Estimation of residual urine via TexticI 3000 Verathon Bladder Scan  MA/nurse performing: Vickie soto Ma   Residual Urine: 31 ml  Indication: Benign prostatic hyperplasia with urinary frequency    Urinary incontinence, unspecified type   Position: Supine  Examination: Incremental scanning of the suprapubic area using 2.0 MHz transducer using copious amounts of acoustic gel.   Findings: An anechoic area was demonstrated which represented the bladder, with measurement of residual urine as noted. I inspected this myself. In that the residual urine was stable or insignificant, refer to plan for treatment and plan necessary at this time.            Procedures        Assessment and Plan    Diagnoses and all orders " for this visit:    1. Benign prostatic hyperplasia with urinary frequency (Primary)  -     POC Urinalysis Dipstick, Automated  -     Bladder Scan    2. Urinary incontinence, unspecified type  -     POC Urinalysis Dipstick, Automated  -     Bladder Scan  -     terazosin (HYTRIN) 5 MG capsule; Take 3 capsules by mouth Every Night.  Dispense: 270 capsule; Refill: 3      We will continue finasteride and increase terazosin dse to 15 mg q hs.     Given he is still with issues with nocturnal enuresis we will have him reach out to his PCP for evaluation  of sleep apnea.    F/u in 8 weeks or sooner if needed.          I spent 10  minutes caring for Jim on this date of service. This time includes time spent by me in the following activities:reviewing tests, obtaining and/or reviewing a separately obtained history, performing a medically appropriate examination and/or evaluation , counseling and educating the patient/family/caregiver, ordering medications, tests, or procedures, and documenting information in the medical record  Follow Up   Return in about 8 weeks (around 6/24/2024) for f/u bph with PVR..  Patient was given instructions and counseling regarding his condition or for health maintenance advice. Please see specific information pulled into the AVS if appropriate.         This document has been electronically signed by MAURA Oneil  April 29, 2024 10:44 EDT

## 2024-05-02 ENCOUNTER — TRANSCRIBE ORDERS (OUTPATIENT)
Dept: ADMINISTRATIVE | Facility: HOSPITAL | Age: 64
End: 2024-05-02
Payer: MEDICARE

## 2024-05-02 DIAGNOSIS — R10.11 ABDOMINAL PAIN, RIGHT UPPER QUADRANT: Primary | ICD-10-CM

## 2024-05-07 ENCOUNTER — HOSPITAL ENCOUNTER (OUTPATIENT)
Dept: ULTRASOUND IMAGING | Facility: HOSPITAL | Age: 64
Discharge: HOME | End: 2024-05-07
Admitting: NURSE PRACTITIONER
Payer: MEDICARE

## 2024-05-07 DIAGNOSIS — R10.11 ABDOMINAL PAIN, RIGHT UPPER QUADRANT: ICD-10-CM

## 2024-05-07 PROCEDURE — 76705 ECHO EXAM OF ABDOMEN: CPT

## 2024-06-26 ENCOUNTER — OFFICE VISIT (OUTPATIENT)
Dept: UROLOGY | Facility: CLINIC | Age: 64
End: 2024-06-26
Payer: MEDICARE

## 2024-06-26 VITALS
OXYGEN SATURATION: 95 % | WEIGHT: 204 LBS | HEART RATE: 68 BPM | HEIGHT: 69 IN | DIASTOLIC BLOOD PRESSURE: 60 MMHG | SYSTOLIC BLOOD PRESSURE: 111 MMHG | BODY MASS INDEX: 30.21 KG/M2

## 2024-06-26 DIAGNOSIS — R35.0 BENIGN PROSTATIC HYPERPLASIA WITH URINARY FREQUENCY: Primary | ICD-10-CM

## 2024-06-26 DIAGNOSIS — N40.1 BENIGN PROSTATIC HYPERPLASIA WITH URINARY FREQUENCY: Primary | ICD-10-CM

## 2024-06-26 DIAGNOSIS — R68.89 NASAL AIRWAY ABNORMALITY: ICD-10-CM

## 2024-06-26 PROBLEM — Z86.010 HISTORY OF COLONIC POLYPS: Status: RESOLVED | Noted: 2021-10-12 | Resolved: 2024-06-26

## 2024-06-26 PROBLEM — Z86.0100 HISTORY OF COLONIC POLYPS: Status: RESOLVED | Noted: 2021-10-12 | Resolved: 2024-06-26

## 2024-06-26 PROBLEM — R00.2 PALPITATION: Status: ACTIVE | Noted: 2024-05-06

## 2024-06-26 LAB
BILIRUB BLD-MCNC: NEGATIVE MG/DL
CLARITY, POC: CLEAR
COLOR UR: YELLOW
EXPIRATION DATE: ABNORMAL
GLUCOSE UR STRIP-MCNC: NEGATIVE MG/DL
KETONES UR QL: ABNORMAL
LEUKOCYTE EST, POC: NEGATIVE
Lab: ABNORMAL
NITRITE UR-MCNC: NEGATIVE MG/ML
PH UR: 7 [PH] (ref 5–8)
PROT UR STRIP-MCNC: ABNORMAL MG/DL
RBC # UR STRIP: NEGATIVE /UL
SP GR UR: 1.01 (ref 1–1.03)
URINE VOLUME: 0
UROBILINOGEN UR QL: ABNORMAL

## 2024-06-26 RX ORDER — ERGOCALCIFEROL 1.25 MG/1
1 CAPSULE ORAL WEEKLY
COMMUNITY
Start: 2024-06-21

## 2024-06-26 NOTE — PROGRESS NOTES
"Chief Complaint: Follow-up (Leakage occasionally)    Subjective         Benign Prostatic Hypertrophy      Jim Verma is a 64 y.o. male presents to [facility] to be seen for f/u  urinary incontinence.    At last visit we continued his finasteride and increased terazosin.     He states nocturia x 2    He is having occasional nocturnal leakage less than before.    He has not had a sleep study or f/u with PCP.     He was placed on something to \"help him sleep\" (clonazepam).    He states he cannot breathe through his nose and believes this to be the issues.     He states daytime urgency and frequency has improved as well. No further daytime leakage.     Previous:  He states that he has had an increasingly difficult time with urinary urgency frequency and urge incontinence over the last 6 months.    He has had a few episodes of nocturnal enuresis.     Nocturia x 2-3     He has been on terazosin for several years for BPH.    He states his stream is good as long as he takes his hytrin.     He denies straining to void as long as he takes terazosin.    He drinks milk water tea daily and beer at times.     He voids every 2-3 hours.     No family hx of gu malignancies.     He is a previus smoker quit over 20 years ago.      Objective     Past Medical History:   Diagnosis Date    Alcohol use     PT STATES THAT HE DRINKS 4 BEERS DAILY    Anxiety and depression     Arthritis     NECK    GERD (gastroesophageal reflux disease)     History of precordial chest pain     EVALUATED BY /ALSO A COUPLE ER VISITS-PT STATES R/T HTN AND ANXIETY, NO RECENT  CP OR  SOA    Hyperlipidemia     Hypertension     Low back pain     Synovial cyst        Past Surgical History:   Procedure Laterality Date    KIDNEY STONE SURGERY      LUMBAR LAMINECTOMY Right 8/2/2023    Procedure: MINIMALLY INVASIVE LUMBAR LAMINECTOMY WITH SYNOVIAL CYST RESECTION, RIGHT APPROACH,  LUMBAR 4-LUMBAR 5;  Surgeon: Valdez Stinson MD;  " Location: [facility] MAIN OR;  Service: Neurosurgery;  Laterality: Right;    SHOULDER SURGERY Right          Current Outpatient Medications:     amLODIPine (NORVASC) 10 MG tablet, Take 1 tablet by mouth Daily., Disp: , Rfl:     clonazePAM (KlonoPIN) 0.5 MG tablet, TAKE 1 TABLET BY MOUTH TWICE DAILY AS NEEDED FOR PANIC OR ANXIETY, Disp: , Rfl:     Desvenlafaxine Succinate ER 25 MG tablet sustained-release 24 hour, Take 1 tablet by mouth Daily As Needed., Disp: , Rfl:     diphenhydrAMINE (BENADRYL) 25 mg capsule, Take 1 capsule by mouth Every 6 (Six) Hours As Needed for Itching., Disp: , Rfl:     EPINEPHrine (EPIPEN) 0.3 MG/0.3ML solution auto-injector injection, SMARTSI Pre-Filled Pen Syringe IM Once, Disp: , Rfl:     finasteride (PROSCAR) 5 MG tablet, TAKE 1 TABLET BY MOUTH DAILY, Disp: 90 tablet, Rfl: 3    gabapentin (NEURONTIN) 100 MG capsule, Take 1 capsule by mouth 3 (Three) Times a Day., Disp: , Rfl:     hydroCHLOROthiazide (HYDRODIURIL) 12.5 MG tablet, Take 1 tablet by mouth Daily As Needed., Disp: , Rfl:     montelukast (SINGULAIR) 10 MG tablet, , Disp: , Rfl:     nebivolol (BYSTOLIC) 2.5 MG tablet, Take 1 tablet by mouth Daily As Needed., Disp: , Rfl:     Omeprazole 20 MG tablet delayed-release, Take 20 mg by mouth Daily., Disp: , Rfl:     rosuvastatin (CRESTOR) 10 MG tablet, Take 1 tablet by mouth Every Night., Disp: , Rfl:     terazosin (HYTRIN) 5 MG capsule, Take 3 capsules by mouth Every Night., Disp: 270 capsule, Rfl: 3    vitamin D (ERGOCALCIFEROL) 1.25 MG (50879 UT) capsule capsule, Take 1 capsule by mouth 1 (One) Time Per Week., Disp: , Rfl:     cyclobenzaprine (FLEXERIL) 10 MG tablet, Take 1 tablet by mouth Every Night. (Patient not taking: Reported on 2024), Disp: , Rfl:     mirtazapine (REMERON) 15 MG tablet, TAKE 1 TABLET(15 MG) BY MOUTH EVERY NIGHT (Patient not taking: Reported on 2024), Disp: , Rfl:     Allergies   Allergen Reactions    Ace Inhibitors Unknown - High Severity     "Metoprolol Unknown - High Severity    Valsartan Unknown - High Severity        Family History   Family history unknown: Yes       Social History     Socioeconomic History    Marital status: Single   Tobacco Use    Smoking status: Never     Passive exposure: Never    Smokeless tobacco: Never   Vaping Use    Vaping status: Never Used   Substance and Sexual Activity    Alcohol use: Not Currently     Comment: 4-5 BEER DAILY    Drug use: Never    Sexual activity: Defer       Vital Signs:   /60 (BP Location: Right arm, Patient Position: Sitting, Cuff Size: Large Adult)   Pulse 68   Ht 175.3 cm (69.02\")   Wt 92.5 kg (204 lb)   SpO2 95%   BMI 30.11 kg/m²      Physical Exam     Result Review :   The following data was reviewed by: MAURA Oneil on 6/26/2024:  Results for orders placed or performed in visit on 06/26/24   Bladder Scan   Result Value Ref Range    Urine Volume 0    POC Urinalysis Dipstick, Automated    Specimen: Urine   Result Value Ref Range    Color Yellow Yellow, Straw, Dark Yellow, Hodan    Clarity, UA Clear Clear    Specific Gravity  1.010 1.005 - 1.030    pH, Urine 7.0 5.0 - 8.0    Leukocytes Negative Negative    Nitrite, UA Negative Negative    Protein, POC 30 mg/dL (A) Negative mg/dL    Glucose, UA Negative Negative mg/dL    Ketones, UA Trace (A) Negative    Urobilinogen, UA 2.0 E.U./dL (A) Normal, 0.2 E.U./dL    Bilirubin Negative Negative    Blood, UA Negative Negative    Lot Number [id number]     Expiration Date 2,025/4          Bladder Scan interpretation 06/26/2024    Estimation of residual urine via BVI 3000 Verathon Bladder Scan  MA/nurse performing: Vickie soto Ma   Residual Urine: 0 ml  Indication: Benign prostatic hyperplasia with urinary frequency    Nasal airway abnormality   Position: Supine  Examination: Incremental scanning of the suprapubic area using 2.0 MHz transducer using copious amounts of acoustic gel.   Findings: An anechoic area was demonstrated which represented the " bladder, with measurement of residual urine as noted. I inspected this myself. In that the residual urine was stable or insignificant, refer to plan for treatment and plan necessary at this time.            Procedures        Assessment and Plan    Diagnoses and all orders for this visit:    1. Benign prostatic hyperplasia with urinary frequency (Primary)  -     POC Urinalysis Dipstick, Automated  -     Bladder Scan    2. Nasal airway abnormality  -     Ambulatory Referral to ENT (Otolaryngology)      We will continue finasteride and increase terazosin     Given he is still with issues with nocturnal enuresis as well as with nasal issues will refer to ENT.             I spent 10  minutes caring for Jim on this date of service. This time includes time spent by me in the following activities:reviewing tests, obtaining and/or reviewing a separately obtained history, performing a medically appropriate examination and/or evaluation , counseling and educating the patient/family/caregiver, ordering medications, tests, or procedures, and documenting information in the medical record  Follow Up   Return in about 6 months (around 12/26/2024) for f/u BPH with PVR .  Patient was given instructions and counseling regarding his condition or for health maintenance advice. Please see specific information pulled into the AVS if appropriate.         This document has been electronically signed by MAURA Oneil  June 26, 2024 09:09 EDT

## 2024-07-24 ENCOUNTER — TELEPHONE (OUTPATIENT)
Dept: OTOLARYNGOLOGY | Facility: CLINIC | Age: 64
End: 2024-07-24

## 2024-07-24 NOTE — TELEPHONE ENCOUNTER
Provider: DR. DALY    Caller: Jim Verma    Relationship to Patient: Self     Phone Number: [phone]    Reason for Call: CANCEL APPOINTMENT DUE TO RUNNING A FEVER AND NOT FEELING WELL    When was the patient last seen: NEW PATIENT    Notes: APPOINTMENT FOR 07/24/24 IS CANCELED.

## 2024-07-31 ENCOUNTER — PATIENT ROUNDING (BHMG ONLY) (OUTPATIENT)
Dept: OTOLARYNGOLOGY | Facility: CLINIC | Age: 64
End: 2024-07-31
Payer: MEDICARE

## 2024-07-31 ENCOUNTER — OFFICE VISIT (OUTPATIENT)
Dept: OTOLARYNGOLOGY | Facility: CLINIC | Age: 64
End: 2024-07-31
Payer: MEDICARE

## 2024-07-31 VITALS
OXYGEN SATURATION: 94 % | DIASTOLIC BLOOD PRESSURE: 86 MMHG | SYSTOLIC BLOOD PRESSURE: 133 MMHG | BODY MASS INDEX: 29.47 KG/M2 | WEIGHT: 199 LBS | HEART RATE: 68 BPM | HEIGHT: 69 IN

## 2024-07-31 DIAGNOSIS — J30.1 SEASONAL ALLERGIC RHINITIS DUE TO POLLEN: ICD-10-CM

## 2024-07-31 DIAGNOSIS — J34.2 DEVIATED NASAL SEPTUM: Primary | ICD-10-CM

## 2024-07-31 DIAGNOSIS — G47.30 SLEEP-DISORDERED BREATHING: ICD-10-CM

## 2024-07-31 PROCEDURE — 1160F RVW MEDS BY RX/DR IN RCRD: CPT | Performed by: OTOLARYNGOLOGY

## 2024-07-31 PROCEDURE — 1159F MED LIST DOCD IN RCRD: CPT | Performed by: OTOLARYNGOLOGY

## 2024-07-31 PROCEDURE — 3079F DIAST BP 80-89 MM HG: CPT | Performed by: OTOLARYNGOLOGY

## 2024-07-31 PROCEDURE — 3075F SYST BP GE 130 - 139MM HG: CPT | Performed by: OTOLARYNGOLOGY

## 2024-07-31 PROCEDURE — 99204 OFFICE O/P NEW MOD 45 MIN: CPT | Performed by: OTOLARYNGOLOGY

## 2024-07-31 NOTE — PROGRESS NOTES
Patient Name: Jim Verma   Visit Date: 07/31/2024   Patient ID: [id number]  Provider: Bro Coronel MD    Sex: male  Location: [facility]   YOB: 1960  Location Address: [address] [place] [address], [address],?[place]?[address]    Primary Care Provider Matilde Gonzales APRN  Location Phone: [phone]    Referring Provider: No ref. provider found        Chief Complaint  Establish Care (Snoring, nasal airway abnormality )    Subjective    History of Present Illness  Jim Verma is a 64 y.o. male who presents to [facility] today as a consult from No ref. provider found.    He presents to the clinic today for evaluation of sleep disordered breathing, as well as issues with nasal obstruction on the left side.  He informs me that he had a previous nasal injury and fracture many years ago.  Since that time he has had difficulty breathing on the left side, but it seems to have gotten worse in the last several years.  He does have some intermittent swelling in his ankles and is on diuretics for this.  He does have some allergic rhinitis issues and seasonal nasal congestion.  He uses Flonase, but is currently not doing nasal saline rinses.  He notes obstruction is worse on the left side and air movement is fairly normal on the right.    He snores at night, and informs me that he had a sleep study in the 90s that was abnormal.  He mouth breathes a lot and notes dry mouth symptoms at night.    Past Medical History:   Diagnosis Date    Alcohol use     PT STATES THAT HE DRINKS 4 BEERS DAILY    Anxiety and depression     Arthritis     NECK    GERD (gastroesophageal reflux disease)     History of precordial chest pain     EVALUATED BY /ALSO A COUPLE ER VISITS-PT STATES R/T HTN AND ANXIETY, NO RECENT  CP OR  SOA    Hyperlipidemia     Hypertension     Low back pain     Synovial cyst        Past Surgical History:   Procedure Laterality Date     KIDNEY STONE SURGERY      LUMBAR LAMINECTOMY Right 2023    Procedure: MINIMALLY INVASIVE LUMBAR LAMINECTOMY WITH SYNOVIAL CYST RESECTION, RIGHT APPROACH,  LUMBAR 4-LUMBAR 5;  Surgeon: Valdez Stinson MD;  Location: [facility] MAIN OR;  Service: Neurosurgery;  Laterality: Right;    SHOULDER SURGERY Right          Current Outpatient Medications:     amLODIPine (NORVASC) 10 MG tablet, Take 1 tablet by mouth Daily., Disp: , Rfl:     clonazePAM (KlonoPIN) 0.5 MG tablet, TAKE 1 TABLET BY MOUTH TWICE DAILY AS NEEDED FOR PANIC OR ANXIETY, Disp: , Rfl:     cyclobenzaprine (FLEXERIL) 10 MG tablet, Take 1 tablet by mouth Every Night. (Patient not taking: Reported on 2024), Disp: , Rfl:     Desvenlafaxine Succinate ER 25 MG tablet sustained-release 24 hour, Take 1 tablet by mouth Daily As Needed., Disp: , Rfl:     diphenhydrAMINE (BENADRYL) 25 mg capsule, Take 1 capsule by mouth Every 6 (Six) Hours As Needed for Itching., Disp: , Rfl:     EPINEPHrine (EPIPEN) 0.3 MG/0.3ML solution auto-injector injection, SMARTSI Pre-Filled Pen Syringe IM Once, Disp: , Rfl:     finasteride (PROSCAR) 5 MG tablet, TAKE 1 TABLET BY MOUTH DAILY, Disp: 90 tablet, Rfl: 3    gabapentin (NEURONTIN) 100 MG capsule, Take 1 capsule by mouth 3 (Three) Times a Day., Disp: , Rfl:     hydroCHLOROthiazide (HYDRODIURIL) 12.5 MG tablet, Take 1 tablet by mouth Daily As Needed., Disp: , Rfl:     mirtazapine (REMERON) 15 MG tablet, TAKE 1 TABLET(15 MG) BY MOUTH EVERY NIGHT (Patient not taking: Reported on 2024), Disp: , Rfl:     montelukast (SINGULAIR) 10 MG tablet, , Disp: , Rfl:     nebivolol (BYSTOLIC) 2.5 MG tablet, Take 1 tablet by mouth Daily As Needed., Disp: , Rfl:     Omeprazole 20 MG tablet delayed-release, Take 20 mg by mouth Daily., Disp: , Rfl:     rosuvastatin (CRESTOR) 10 MG tablet, Take 1 tablet by mouth Every Night., Disp: , Rfl:     terazosin (HYTRIN) 5 MG capsule, Take 3 capsules by mouth Every Night., Disp: 270 capsule, Rfl: 3     "vitamin D (ERGOCALCIFEROL) 1.25 MG (70290 UT) capsule capsule, Take 1 capsule by mouth 1 (One) Time Per Week., Disp: , Rfl:      Allergies   Allergen Reactions    Ace Inhibitors Unknown - High Severity    Metoprolol Unknown - High Severity    Valsartan Unknown - High Severity       Family History   Family history unknown: Yes        Social History     Social History Narrative    Not on file       Objective     Vital Signs:   /86 (BP Location: Right arm, Patient Position: Sitting, Cuff Size: Adult)   Pulse 68   Ht 175.3 cm (69.02\")   Wt 90.3 kg (199 lb)   SpO2 94%   BMI 29.37 kg/m²       Physical Exam    Constitutional   Appearance  : well developed, well-nourished, alert and in no acute distress, voice clear and strong    Head  Inspection  : no deformities or lesions  Face  Inspection  : No facial lesions; House-Brackmann I/VI bilaterally  Palpation  : No TMJ crepitus nor  muscle tenderness bilaterally    Eyes  Vision  Visual Fields  : Extraocular movements are intact. No spontaneous or gaze-induced nystagmus.  Conjunctivae  : clear  Sclerae  : clear  Pupils and Irises  : pupils equal, round, and reactive to light.     Ears, Nose, Mouth and Throat    Ears    External Ears  : appearance within normal limits, no lesions present  Otoscopic Examination  : Tympanic membrane appearance within normal limits bilaterally without perforations, well-aerated middle ears  Hearing  : intact to conversational voice both ears  Tunning fork testing:     :    Nose    External Nose  : Nasal bones deviated towards the right  Intranasal Exam  : mucosa within normal limits, vestibules normal, no intranasal lesions present, septum with S-shaped deformity, inferior turbinate hypertrophy, sinuses non tender to percussion, Mateusz maneuver reveals decreased air movement on the left  Oral Cavity    Oral Mucosa  : oral mucosa normal without pallor or cyanosis  Lips  : lip appearance normal  Teeth  : normal dentition for " age  Gums  : gums pink, non-swollen, no bleeding present  Tongue  : tongue appearance normal; normal mobility  Palate  : hard palate normal, soft palate appearance normal with symmetric mobility    Throat    Oropharynx  : no inflammation or lesions present, tonsils within normal limits  Hypopharynx  : appearance within normal limits, superior epiglottis within normal limits  Larynx  : appearance within normal limits, vocal cords within normal limits, no lesions present    Neck  Inspection/Palpation  : normal appearance, no masses or tenderness, trachea midline; thyroid size normal, nontender, no nodules or masses present on palpation    Respiratory  Respiratory Effort  : breathing unlabored  Inspection of Chest  : normal appearance, no retractions    Cardiovascular  Heart  : regular rate and rhythm    Lymphatic  Neck  : no lymphadenopathy present  Supraclavicular Nodes  : no lymphadenopathy present  Preauricular Nodes  : no lymphadenopathy present    Skin and Subcutaneous Tissue  General Inspection  : Regarding face and neck - there are no rashes present, no lesions present, and no areas of discoloration    Neurologic  Cranial Nerves  : cranial nerves II-XII are grossly intact bilaterally  Gait and Station  : normal gait, able to stand without diffculty    Psychiatric  Judgement and Insight  : judgment and insight intact  Mood and Affect  : mood normal, affect appropriate              Assessment and Plan    Diagnoses and all orders for this visit:    1. Deviated nasal septum (Primary)    2. Sleep-disordered breathing  -     Ambulatory Referral to Sleep Medicine    3. Seasonal allergic rhinitis due to pollen    Examination today revealed deviated nasal bones and nasal septum with obstruction on the left side on Dallam maneuver.  I discussed using nasal saline rinses and Flonase on a nightly basis for this and to elevate the head of the bed in case there is turbinate congestion happening at night as he does have some  issues with swollen ankles.  I discussed septoplasty and turbinate reductions briefly, and will discuss it again in 3 months when I see him back.  I did order a referral to sleep medicine as he needs a sleep study.  His soft palate is very low and his throat anatomy is problematic for nighttime breathing.  I will discuss results at the follow-up as well.    Follow Up   No follow-ups on file.  Patient was given instructions and counseling regarding his condition or for health maintenance advice. Please see specific information pulled into the AVS if appropriate.

## 2024-07-31 NOTE — PROGRESS NOTES
A [employer] message has been send to the patient for PATIENT ROUNDING with [facility].

## 2024-08-27 ENCOUNTER — TELEPHONE (OUTPATIENT)
Dept: OTOLARYNGOLOGY | Facility: CLINIC | Age: 64
End: 2024-08-27
Payer: MEDICARE

## 2024-08-27 NOTE — TELEPHONE ENCOUNTER
Left message appointment on 10/30 in the [place] office with Dr Coronel has been cancelled since he is no longer with [facility] and to give the office a call back after he has his sleep study done so we can get him rescheduled with one of the other providers to go over results

## 2024-10-08 ENCOUNTER — OFFICE VISIT (OUTPATIENT)
Dept: SLEEP MEDICINE | Facility: HOSPITAL | Age: 64
End: 2024-10-08
Payer: MEDICARE

## 2024-10-08 VITALS
OXYGEN SATURATION: 96 % | HEART RATE: 63 BPM | BODY MASS INDEX: 31.1 KG/M2 | SYSTOLIC BLOOD PRESSURE: 118 MMHG | HEIGHT: 69 IN | DIASTOLIC BLOOD PRESSURE: 74 MMHG | WEIGHT: 210 LBS

## 2024-10-08 DIAGNOSIS — R06.83 SNORING: ICD-10-CM

## 2024-10-08 DIAGNOSIS — R35.1 NOCTURIA: ICD-10-CM

## 2024-10-08 DIAGNOSIS — R32 ENURESIS: ICD-10-CM

## 2024-10-08 DIAGNOSIS — E66.9 OBESITY (BMI 30-39.9): ICD-10-CM

## 2024-10-08 DIAGNOSIS — R29.818 SUSPECTED SLEEP APNEA: Primary | ICD-10-CM

## 2024-10-08 DIAGNOSIS — G47.10 HYPERSOMNIA: ICD-10-CM

## 2024-10-08 PROCEDURE — G0463 HOSPITAL OUTPT CLINIC VISIT: HCPCS

## 2024-10-08 PROCEDURE — 3074F SYST BP LT 130 MM HG: CPT | Performed by: INTERNAL MEDICINE

## 2024-10-08 PROCEDURE — 3078F DIAST BP <80 MM HG: CPT | Performed by: INTERNAL MEDICINE

## 2024-10-08 NOTE — PROGRESS NOTES
Sleep Consultation    Patient Name: Jim Verma  Age/Sex: 64 y.o. male  : 1960  MRN: [id number]    Date of Encounter Visit: 10/08/2024  Encounter Provider: Sajan Haley MD  Referring Provider: Sajan Haley MD  Place of Service: [facility]  Patient Care Team:  Lorelei Finley APRN as PCP - General (Nurse Practitioner)    Subjective:     Reason for Consult: Evaluation for sleep apnea    History of Present Illness:  Jim Verma is a 64 y.o. male is here for evaluation of MAGI due to suggestive symptoms.  He did have sleep study in  that was negative but he does report significant weight gain since   Patient complains of daytime fatigue and sleepiness with an [place] Sleepiness Scale (ESS) of 16.  Patient complains of loud snoring in all position and an episode of waking up coughing or choking and waking up with a sore dry mouth.  He does have nasal obstruction and difficulty with nose breathing   Patient has some leg jerking at night but no significant symptoms to suggest restless leg syndrome  Patient denies any cataplexy, sleep paralysis or other symptoms to suggest narcolepsy.  Problem falling asleep and staying asleep with frequent awakenings and mild nocturia with some enuresis  Positive nightmares but no sleepwalking or other significant parasomnias  Denies any history of seizure disorder or recent head trauma.  Patient spends adequate amount of time in bed with no evidence of sleep restriction or improper sleep hygiene.  Bedtime is variable so is the wake up time but patient reports 6 to 7 hours of sleep, sleep onset may be valuable and he does take some sleep aid  Caffeine intake is usually 1-2 caffeinated beverages per day, patient has significant excessive alcohol consumption of 30 alcoholic beverages per week, no smoking illicit substance abuse  Patient also report using marijuana in the past    Comorbidities include: Hypertension, anxiety, acid reflux  and arthritis    Review of Systems:   A twelve-system review was conducted and was negative except for the following: Difficulty urination, incontinence, frequent urination, nasal congestion, TMJ, fatigue, swelling in the legs, anxiety, heartburn and abdominal bloating and easy bruising.        Past Medical History:  Past Medical History:   Diagnosis Date    Alcohol use     PT STATES THAT HE DRINKS 4 BEERS DAILY    Anxiety and depression     Arthritis     NECK    GERD (gastroesophageal reflux disease)     History of precordial chest pain     EVALUATED BY /ALSO A COUPLE ER VISITS-PT STATES R/T HTN AND ANXIETY, NO RECENT  CP OR  SOA    Hyperlipidemia     Hypertension     Low back pain     Synovial cyst        Past Surgical History:   Procedure Laterality Date    KIDNEY STONE SURGERY      LUMBAR LAMINECTOMY Right 2023    Procedure: MINIMALLY INVASIVE LUMBAR LAMINECTOMY WITH SYNOVIAL CYST RESECTION, RIGHT APPROACH,  LUMBAR 4-LUMBAR 5;  Surgeon: Valdez Stinson MD;  Location: [facility] OR;  Service: Neurosurgery;  Laterality: Right;    SHOULDER SURGERY Right        Home Medications:     Current Outpatient Medications:     amLODIPine (NORVASC) 10 MG tablet, Take 1 tablet by mouth Daily., Disp: , Rfl:     clonazePAM (KlonoPIN) 0.5 MG tablet, TAKE 1 TABLET BY MOUTH TWICE DAILY AS NEEDED FOR PANIC OR ANXIETY, Disp: , Rfl:     Desvenlafaxine Succinate ER 25 MG tablet sustained-release 24 hour, Take 1 tablet by mouth Daily As Needed., Disp: , Rfl:     diphenhydrAMINE (BENADRYL) 25 mg capsule, Take 1 capsule by mouth Every 6 (Six) Hours As Needed for Itching., Disp: , Rfl:     EPINEPHrine (EPIPEN) 0.3 MG/0.3ML solution auto-injector injection, SMARTSI Pre-Filled Pen Syringe IM Once, Disp: , Rfl:     finasteride (PROSCAR) 5 MG tablet, TAKE 1 TABLET BY MOUTH DAILY, Disp: 90 tablet, Rfl: 3    gabapentin (NEURONTIN) 100 MG capsule, Take 1 capsule by mouth 3 (Three) Times a Day., Disp: , Rfl:     hydroCHLOROthiazide  "(HYDRODIURIL) 12.5 MG tablet, Take 1 tablet by mouth Daily As Needed., Disp: , Rfl:     montelukast (SINGULAIR) 10 MG tablet, , Disp: , Rfl:     nebivolol (BYSTOLIC) 2.5 MG tablet, Take 1 tablet by mouth Daily As Needed., Disp: , Rfl:     Omeprazole 20 MG tablet delayed-release, Take 20 mg by mouth Daily., Disp: , Rfl:     rosuvastatin (CRESTOR) 10 MG tablet, Take 1 tablet by mouth Every Night., Disp: , Rfl:     terazosin (HYTRIN) 5 MG capsule, Take 3 capsules by mouth Every Night., Disp: 270 capsule, Rfl: 3    vitamin D (ERGOCALCIFEROL) 1.25 MG (86725 UT) capsule capsule, Take 1 capsule by mouth 1 (One) Time Per Week., Disp: , Rfl:     cyclobenzaprine (FLEXERIL) 10 MG tablet, Take 1 tablet by mouth Every Night. (Patient not taking: Reported on 6/26/2024), Disp: , Rfl:     mirtazapine (REMERON) 15 MG tablet, TAKE 1 TABLET(15 MG) BY MOUTH EVERY NIGHT (Patient not taking: Reported on 6/26/2024), Disp: , Rfl:     Allergies:  Allergies   Allergen Reactions    Ace Inhibitors Unknown - High Severity    Metoprolol Unknown - High Severity    Valsartan Unknown - High Severity       Past Social History:  Social History     Socioeconomic History    Marital status: Single   Tobacco Use    Smoking status: Never     Passive exposure: Never    Smokeless tobacco: Never   Vaping Use    Vaping status: Never Used   Substance and Sexual Activity    Alcohol use: Not Currently     Comment: 4-5 BEER DAILY    Drug use: Never    Sexual activity: Defer       Past Family History:  Family History   Problem Relation Age of Onset    Obesity Sister     Parkinsonism Paternal Grandfather       No history of MAGI   Objective:        Vital Signs:   Visit Vitals  /74 (BP Location: Left arm, Patient Position: Sitting)   Pulse 63   Ht 175.3 cm (69.02\")   Wt 95.3 kg (210 lb)   SpO2 96%   BMI 31.00 kg/m²     Wt Readings from Last 3 Encounters:   10/08/24 95.3 kg (210 lb)   07/31/24 90.3 kg (199 lb)   06/26/24 92.5 kg (204 lb)     Neck Circumference: " 15.5 inches    Physical Exam:   GEN:  No acute distress, alert, cooperative, well developed   EYES:   Sclerae clear. No icterus. PERRL. Normal EOM  ENT:   External ears/nose normal, no oral lesions, no thrush, mucous membranes moist, Septum midline. Mallampati IV airway. Redundant membranous soft palate, with large uvula, Bilateral turbinate hypertrophy   NECK:  Supple, midline trachea, no JVD  LUNGS: Normal chest on inspection, CTAB, no wheezes. No rhonchi. No crackles. Respirations regular, even and unlabored.   CV:  Regular rhythm and rate. Normal S1/S2. No murmurs, gallops, or rubs noted.  ABD:  Soft, nontender and nondistended. Normal bowel sounds. No guarding  EXT:  Moves all extremities well. No cyanosis. No redness. 1+ edema.   Skin: Dry, intact, no bleeding      Diagnostic Data:  No notes from his prior sleep studies from 2002      Assessment and Plan:       ICD-10-CM ICD-9-CM   1. Suspected sleep apnea  R29.818 781.99   2. Snoring  R06.83 786.09   3. Hypersomnia  G47.10 780.54   4. Obesity (BMI 30-39.9)  E66.9 278.00   5. Enuresis  R32 788.30   6. Nocturia  R35.1 788.43       Recommendations:     Patient is a good candidate for the home sleep study which will be ordered today  If the home sleep study is inconclusive or nondiagnostic, we will consider the in-lab sleep study  Patient is agreeable with the CPAP therapy and will be initiated accordingly     Patient was educated in depth about MAGI and cardiovascular consequences if left untreated, including but not limited to CHF, CAD, arrhythmias, CVA, and/or HTN. Education also provided about the diagnostic tools for MAGI, including the polysomnography and the treatment modalities, including the CPAP.     If patient has obstructive sleep apnea the recommend treatment is CPAP and will start CPAP and patient will follow up within 31-90 days after starting CPAP for compliance review.   Will address alternative treatment option if intolerant to CPAP     Patient  was educated about the risk of driving while sleepy and was strongly encouraged to avoid any  driving if sleepy given the high risk of motor vehicle accidents and injury to self and other pedestrians/vehicles.     Adherence to the CPAP is a key factor in successful treatment of MAGI and the patient was encouraged to contact us in case of problem with the CPAP or the mask that can limit the tolerance of the compliance with the therapy.    Patient was educated about the impact of obesity on sleep apnea and the benefit of weight loss and weight loss was recommended    Orders Placed This Encounter   Procedures    Home Sleep Study     No orders of the defined types were placed in this encounter.     Return in about 3 months (around 1/8/2025).    Sajan Haley MD   [facility]   10/08/24  13:33 EDT    Dictated utilizing Dragon dictation

## 2024-11-13 ENCOUNTER — HOSPITAL ENCOUNTER (OUTPATIENT)
Dept: SLEEP MEDICINE | Facility: HOSPITAL | Age: 64
Discharge: HOME | End: 2024-11-13
Admitting: INTERNAL MEDICINE
Payer: MEDICARE

## 2024-11-13 DIAGNOSIS — R29.818 SUSPECTED SLEEP APNEA: ICD-10-CM

## 2024-11-13 DIAGNOSIS — R06.83 SNORING: ICD-10-CM

## 2024-11-13 DIAGNOSIS — E66.9 OBESITY (BMI 30-39.9): ICD-10-CM

## 2024-11-13 DIAGNOSIS — G47.10 HYPERSOMNIA: ICD-10-CM

## 2024-11-13 PROCEDURE — G0399 HOME SLEEP TEST/TYPE 3 PORTA: HCPCS

## 2024-12-03 DIAGNOSIS — R06.83 SNORING: ICD-10-CM

## 2024-12-03 DIAGNOSIS — G47.34 SLEEP RELATED HYPOXIA: ICD-10-CM

## 2024-12-03 DIAGNOSIS — G47.33 OSA (OBSTRUCTIVE SLEEP APNEA): Primary | ICD-10-CM

## 2024-12-05 ENCOUNTER — TELEPHONE (OUTPATIENT)
Dept: SLEEP MEDICINE | Facility: HOSPITAL | Age: 64
End: 2024-12-05
Payer: MEDICARE

## 2024-12-31 ENCOUNTER — TELEPHONE (OUTPATIENT)
Dept: SLEEP MEDICINE | Facility: HOSPITAL | Age: 64
End: 2024-12-31
Payer: MEDICARE

## 2025-03-24 ENCOUNTER — TELEPHONE (OUTPATIENT)
Dept: UROLOGY | Facility: CLINIC | Age: 65
End: 2025-03-24

## 2025-03-24 NOTE — TELEPHONE ENCOUNTER
Attempted to call patient in regards to no-show appointment. No voicemail set up for patient so will send letter for patient to reschedule.

## 2025-04-30 DIAGNOSIS — R35.0 BENIGN PROSTATIC HYPERPLASIA WITH URINARY FREQUENCY: ICD-10-CM

## 2025-04-30 DIAGNOSIS — N40.1 BENIGN PROSTATIC HYPERPLASIA WITH URINARY FREQUENCY: ICD-10-CM

## 2025-04-30 RX ORDER — FINASTERIDE 5 MG/1
5 TABLET, FILM COATED ORAL DAILY
Qty: 90 TABLET | Refills: 3 | OUTPATIENT
Start: 2025-04-30

## 2025-06-11 DIAGNOSIS — R32 URINARY INCONTINENCE, UNSPECIFIED TYPE: ICD-10-CM

## 2025-06-12 RX ORDER — TERAZOSIN 5 MG/1
15 CAPSULE ORAL NIGHTLY
Qty: 270 CAPSULE | Refills: 3 | OUTPATIENT
Start: 2025-06-12

## 2025-06-27 ENCOUNTER — TELEPHONE (OUTPATIENT)
Dept: UROLOGY | Age: 65
End: 2025-06-27
Payer: MEDICARE

## 2025-07-21 ENCOUNTER — TELEPHONE (OUTPATIENT)
Dept: OTOLARYNGOLOGY | Facility: CLINIC | Age: 65
End: 2025-07-21
Payer: MEDICARE

## 2025-07-21 NOTE — TELEPHONE ENCOUNTER
HUB TO RELAY: I left patient a message about his insurance. Please verify if he has [employer] Medicare Replacement HMO plan, and if so, then his appointment should be canceled.

## 2025-07-29 ENCOUNTER — TELEPHONE (OUTPATIENT)
Dept: UROLOGY | Facility: CLINIC | Age: 65
End: 2025-07-29
Payer: MEDICARE

## 2025-07-29 NOTE — TELEPHONE ENCOUNTER
PER APPT CX NOTE FOR APPT ON 7/21 W/[facility]- OFFICE CX'D PT HAS [facility] HMO ADN WE ARE NON PAR, HE IS AWARE TO REACH OUT TO PCP/SENDING A MESSAGE TO [facility] MEDICAL STAFF/ANYTHING ELSE TO DO??

## (undated) DEVICE — GLV SURG BIOGEL LTX PF 7 1/2

## (undated) DEVICE — GAMMEX® NON-LATEX SIZE 7.5, STERILE NEOPRENE POWDER-FREE SURGICAL GLOVE: Brand: GAMMEX

## (undated) DEVICE — SUT VIC 0/0 UR6 27IN DYED J603H

## (undated) DEVICE — STERILE POLYISOPRENE POWDER-FREE SURGICAL GLOVES WITH EMOLLIENT COATING: Brand: PROTEXIS

## (undated) DEVICE — HEMOST ABS SURGIFOAM SZ12/7 2X6 7MM
Type: IMPLANTABLE DEVICE | Site: SPINE LUMBAR | Status: NON-FUNCTIONAL
Removed: 2023-08-02

## (undated) DEVICE — SLV SCD KN/LEN ADJ EXPRSS BLENDED MD 1P/U

## (undated) DEVICE — LAMINECTOMY CERVICAL DISC-LF: Brand: MEDLINE INDUSTRIES, INC.

## (undated) DEVICE — ANTIBACTERIAL UNDYED BRAIDED (POLYGLACTIN 910), SYNTHETIC ABSORBABLE SUTURE: Brand: COATED VICRYL

## (undated) DEVICE — STERILE POLYISOPRENE POWDER-FREE SURGICAL GLOVES: Brand: PROTEXIS

## (undated) DEVICE — PENCL E/S HNDSWCH ROCKR CB

## (undated) DEVICE — DRP MICROSCP LECIA W/CLEARLENS 137X381CM